# Patient Record
Sex: MALE | Race: BLACK OR AFRICAN AMERICAN | URBAN - METROPOLITAN AREA
[De-identification: names, ages, dates, MRNs, and addresses within clinical notes are randomized per-mention and may not be internally consistent; named-entity substitution may affect disease eponyms.]

---

## 2018-05-16 ENCOUNTER — HOSPITAL ENCOUNTER (INPATIENT)
Facility: CLINIC | Age: 43
LOS: 7 days | Discharge: PSYCHIATRIC OR SUBSTANCE USE FACILITY | End: 2018-05-23
Attending: EMERGENCY MEDICINE | Admitting: PSYCHIATRY & NEUROLOGY
Payer: COMMERCIAL

## 2018-05-16 DIAGNOSIS — Z91.148 NONCOMPLIANCE WITH MEDICATION REGIMEN: ICD-10-CM

## 2018-05-16 DIAGNOSIS — R45.851 SUICIDAL IDEATION: ICD-10-CM

## 2018-05-16 DIAGNOSIS — F33.2 MAJOR DEPRESSIVE DISORDER, RECURRENT, SEVERE WITHOUT PSYCHOTIC FEATURES (H): Primary | ICD-10-CM

## 2018-05-16 DIAGNOSIS — B20 HUMAN IMMUNODEFICIENCY VIRUS (HIV) DISEASE (H): ICD-10-CM

## 2018-05-16 LAB
ALBUMIN SERPL-MCNC: 3.7 G/DL (ref 3.4–5)
ALP SERPL-CCNC: 69 U/L (ref 40–150)
ALT SERPL W P-5'-P-CCNC: 23 U/L (ref 0–70)
AMPHETAMINES UR QL SCN: POSITIVE
ANION GAP SERPL CALCULATED.3IONS-SCNC: 8 MMOL/L (ref 3–14)
AST SERPL W P-5'-P-CCNC: 16 U/L (ref 0–45)
BARBITURATES UR QL: NEGATIVE
BASOPHILS # BLD AUTO: 0 10E9/L (ref 0–0.2)
BASOPHILS NFR BLD AUTO: 0.2 %
BENZODIAZ UR QL: NEGATIVE
BILIRUB SERPL-MCNC: 0.4 MG/DL (ref 0.2–1.3)
BUN SERPL-MCNC: 13 MG/DL (ref 7–30)
CALCIUM SERPL-MCNC: 8.9 MG/DL (ref 8.5–10.1)
CANNABINOIDS UR QL SCN: NEGATIVE
CHLORIDE SERPL-SCNC: 104 MMOL/L (ref 94–109)
CO2 SERPL-SCNC: 28 MMOL/L (ref 20–32)
COCAINE UR QL: NEGATIVE
CREAT SERPL-MCNC: 1.15 MG/DL (ref 0.66–1.25)
DIFFERENTIAL METHOD BLD: NORMAL
EOSINOPHIL # BLD AUTO: 0.3 10E9/L (ref 0–0.7)
EOSINOPHIL NFR BLD AUTO: 4.1 %
ERYTHROCYTE [DISTWIDTH] IN BLOOD BY AUTOMATED COUNT: 13.5 % (ref 10–15)
GFR SERPL CREATININE-BSD FRML MDRD: 70 ML/MIN/1.7M2
GLUCOSE SERPL-MCNC: 96 MG/DL (ref 70–99)
HCT VFR BLD AUTO: 45.5 % (ref 40–53)
HGB BLD-MCNC: 15.9 G/DL (ref 13.3–17.7)
IMM GRANULOCYTES # BLD: 0 10E9/L (ref 0–0.4)
IMM GRANULOCYTES NFR BLD: 0.2 %
LYMPHOCYTES # BLD AUTO: 2.9 10E9/L (ref 0.8–5.3)
LYMPHOCYTES NFR BLD AUTO: 36 %
MCH RBC QN AUTO: 31.2 PG (ref 26.5–33)
MCHC RBC AUTO-ENTMCNC: 34.9 G/DL (ref 31.5–36.5)
MCV RBC AUTO: 89 FL (ref 78–100)
MONOCYTES # BLD AUTO: 0.5 10E9/L (ref 0–1.3)
MONOCYTES NFR BLD AUTO: 5.5 %
NEUTROPHILS # BLD AUTO: 4.4 10E9/L (ref 1.6–8.3)
NEUTROPHILS NFR BLD AUTO: 54 %
NRBC # BLD AUTO: 0 10*3/UL
NRBC BLD AUTO-RTO: 0 /100
OPIATES UR QL SCN: NEGATIVE
PCP UR QL SCN: NEGATIVE
PLATELET # BLD AUTO: 227 10E9/L (ref 150–450)
POTASSIUM SERPL-SCNC: 3.4 MMOL/L (ref 3.4–5.3)
PROT SERPL-MCNC: 7.8 G/DL (ref 6.8–8.8)
RBC # BLD AUTO: 5.09 10E12/L (ref 4.4–5.9)
SODIUM SERPL-SCNC: 140 MMOL/L (ref 133–144)
TSH SERPL DL<=0.005 MIU/L-ACNC: 1.26 MU/L (ref 0.4–4)
WBC # BLD AUTO: 8.1 10E9/L (ref 4–11)

## 2018-05-16 PROCEDURE — 36415 COLL VENOUS BLD VENIPUNCTURE: CPT | Performed by: PSYCHIATRY & NEUROLOGY

## 2018-05-16 PROCEDURE — 85025 COMPLETE CBC W/AUTO DIFF WBC: CPT | Performed by: PSYCHIATRY & NEUROLOGY

## 2018-05-16 PROCEDURE — 25000132 ZZH RX MED GY IP 250 OP 250 PS 637: Performed by: PSYCHIATRY & NEUROLOGY

## 2018-05-16 PROCEDURE — 80053 COMPREHEN METABOLIC PANEL: CPT | Performed by: PSYCHIATRY & NEUROLOGY

## 2018-05-16 PROCEDURE — 90853 GROUP PSYCHOTHERAPY: CPT

## 2018-05-16 PROCEDURE — 12400006 ZZH R&B MH INTERMEDIATE

## 2018-05-16 PROCEDURE — 80307 DRUG TEST PRSMV CHEM ANLYZR: CPT | Performed by: EMERGENCY MEDICINE

## 2018-05-16 PROCEDURE — 84443 ASSAY THYROID STIM HORMONE: CPT | Performed by: PSYCHIATRY & NEUROLOGY

## 2018-05-16 PROCEDURE — 99222 1ST HOSP IP/OBS MODERATE 55: CPT | Performed by: PHYSICIAN ASSISTANT

## 2018-05-16 PROCEDURE — 99285 EMERGENCY DEPT VISIT HI MDM: CPT

## 2018-05-16 PROCEDURE — 99207 ZZC CDG-MDM COMPONENT: MEETS MODERATE - UP CODED: CPT | Performed by: PHYSICIAN ASSISTANT

## 2018-05-16 RX ORDER — QUETIAPINE FUMARATE 25 MG/1
25-50 TABLET, FILM COATED ORAL EVERY 6 HOURS PRN
Status: DISCONTINUED | OUTPATIENT
Start: 2018-05-16 | End: 2018-05-23 | Stop reason: HOSPADM

## 2018-05-16 RX ORDER — MIRTAZAPINE 15 MG/1
15 TABLET, FILM COATED ORAL AT BEDTIME
Status: DISCONTINUED | OUTPATIENT
Start: 2018-05-16 | End: 2018-05-23 | Stop reason: HOSPADM

## 2018-05-16 RX ORDER — OLANZAPINE 5 MG/1
5 TABLET, ORALLY DISINTEGRATING ORAL 2 TIMES DAILY
Status: DISCONTINUED | OUTPATIENT
Start: 2018-05-16 | End: 2018-05-21

## 2018-05-16 RX ORDER — HYDROXYZINE HYDROCHLORIDE 25 MG/1
25 TABLET, FILM COATED ORAL EVERY 4 HOURS PRN
Status: DISCONTINUED | OUTPATIENT
Start: 2018-05-16 | End: 2018-05-23 | Stop reason: HOSPADM

## 2018-05-16 RX ADMIN — OLANZAPINE 5 MG: 5 TABLET, ORALLY DISINTEGRATING ORAL at 21:55

## 2018-05-16 RX ADMIN — OLANZAPINE 5 MG: 5 TABLET, ORALLY DISINTEGRATING ORAL at 13:47

## 2018-05-16 RX ADMIN — MIRTAZAPINE 15 MG: 15 TABLET, FILM COATED ORAL at 21:55

## 2018-05-16 ASSESSMENT — ENCOUNTER SYMPTOMS
DYSPHORIC MOOD: 1
HALLUCINATIONS: 0

## 2018-05-16 NOTE — H&P
"He Admitted:     05/16/2018      IDENTIFYING DATA AND REASON FOR REFERRAL:  Sd Gee is a 43-year-old man who reports he is single but in a relationship with his partner of 4 years.  He states they are currently having a dimitris situation and their relationship.  He resides with his partner in [place].  He states he works as a  for a local hotel and has held a job for the last 2 years.  He holds a 4-year degree from [employer] in marketing and accounting and reports a couple prior psychiatric hospitalizations.  He presented to the hospital on account of suicidality and is admitted as a voluntary patient.      CHIEF COMPLAINT:  \"I am playing [place] [employer] Heal[employer] with my life right now, and I wanted to stop.\"      HISTORY OF PRESENT ILLNESS:  The patient reports that he was diagnosed HIV positive in 2005, and at that time, he thought he had been handed a death sentence.  Since then, he reports he has done very well on medication and claims his viral load is about 628.  He states he feels healthy otherwise and has been able to maintain employment and his social status.  He tells me that 4 years ago, his mom gave him an ultimatum to get help for his chemical use problems, and at that time, he checked himself into the [facility] and was there for 21 days.  He reports that it did nothing for him, because he was not willing to quit using alcohol at the time.  He states that he continues to drink an average of 3-4 glasses of wine every day, most times with dinner; white he claims that this can escalate at times.  He denies that he has ever experienced blackouts or alcohol withdrawal seizures.  He denies history of delirium tremens.  He does not endorse missing work on account of his drinking and denies any legal consequences from his alcohol use.  Per his report, about 3 months ago, he decided to try methamphetamines for the first time, and unfortunately for him, he has been using " "methamphetamines since then.  He reports that he has become a different person.  He states he gets bouts of irritability and has developed a very short fuse.  He reports a decline in his appetite for food.  States his sleep has become very poor, as he has high energy for extended periods of time.  He states that his mood has been drastically affected, as he has become more depressed.  He states that he experiences paranoia and auditory hallucinations and feels he has \"hyperacute hearing\" when he is on methamphetamines.  He states he gets very anxious and fidgety and claims he often crashes from his high when he is at work, prompting him to use caffeinated beverages to keep himself awake.  He reports that lately, he has been experiencing feelings of hopelessness on account of which he has come into the hospital on a voluntary basis to stop the roller coaster he is on.  He states he knows that he would not pursue outpatient treatment on his own; hence, his presentation to the hospital, and he expresses a willingness to complete chemical use assessment, hoping that he will get referred to residential CD treatment.  The patient endorses feelings of hopelessness, helplessness, but denies any active self-harm thoughts.  He reports anhedonia, but he denies history consistent with babs.  He does endorse anxiety spectrum symptoms but denies symptoms suggestive of obsessions, compulsions, or PTSD.  He denies other illicit drug use.  He tells me he is particularly worried about his current situation, because there is strong family history of depression, and his mother's sister reportedly completed suicide, and he feels that there may be other family members who suffer from depression, but the family does not talk about mental health issues, as he claims they are \"a [facility] family where talking about mental health is taboo.\"  The patient claims he was, at some point, on Wellbutrin but is currently not on any " medications.  He denies history consistent with ADHD, pathological gambling, or compulsive sexual behavior.      PAST PSYCHIATRIC HISTORY:  The patient reports that he currently does not have a psychiatrist or therapist but reports previous psychiatric hospitalization.      CHEMICAL USE HISTORY:  As described above.      PAST MEDICAL HISTORY:  Human immunodeficiency virus positive but asymptomatic.      PAST SURGICAL HISTORY:  None reported.      FAMILY PSYCHIATRIC HISTORY:  Depression, on both sides of the family.      SOCIAL HISTORY:  The patient reports that he is originally from [place], [place].  He finally moved to [place] when he was 12.  His father is .  He states he has 4 brothers and 6 sisters, and they are all from the same parents.  He states he is the third youngest.  He denies any history of physical, emotional or sexual abuse.  Following high school, he attended [employer].  He denies  or criminal history.      REVIEW OF SYSTEMS:  I refer the reader to the 10-point review of systems documented by Deanne Zepeda PA-C on 2018 at 10:35 a.m.      VITAL SIGNS:  Blood pressure 117/83, pulse 89, respirations 16, temperature 97.5, weight 197 pounds.      MENTAL STATUS EXAMINATION:  This is a middle-aged man who appears his stated age of 43.  He is dressed in hospital scrubs and ambulates on his own without difficulty.  He makes good eye contact and speaks softly but clearly and coherently.  His mood is described as dysphoric, and his affect is labile.  His thought process is logical and relevant.  He does not endorse the presence of auditory or visual hallucinations but admits to paranoia.  He admits to fleeting self-harm thoughts with no plans.  He does not endorse any homicidal ideations, plans or intent.  His impulse control is marginal.  His associations are tight.  His language is appropriate.  His gait and station are within normal limits.  His muscle strength is adequate.  He  displays adequate recall of recent and remote events.  His attention span and concentration are limited.  His impulse control is marginal.  The patient displays fair insight and judgment.  Risk assessment at this point is considered moderate.      DIAGNOSTIC IMPRESSION:  Christiana Gee is a 43-year-old man who is in a 4-year relationship with his partner and is currently employed, who presents to the hospital on account of worsening depression in the context of abusing methamphetamines and alcohol.  He is requesting help, because he recently was contemplating suicide.      ADMITTING DIAGNOSES:   1.  Major depressive disorder, recurrent, moderate.   2.  Alcohol use disorder.   3.  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   4.  Human immunodeficiency virus, on HAART, CD4 count of 628 as of 18.      PLAN:  The patient will be maintained on the step-down unit.  He is a voluntary patient at this time.  He will be encouraged to participate in individual milieu and group therapy.  Staff will provide a safe place for him, and he will be encouraged to ventilate his stressors.  He will be offered mirtazapine at 15 mg at bedtime along with Zyprexa 10 mg at bedtime.  Estimated length of stay 4-7 days.         JOSEPH CASTELAN MD             D: 2018   T: 2018   MT: FRANCHESCA      Name:     CHRISTIANA GEE   MRN:      [id number]        Account:      [id number]   :      1975        Admitted:     2018                   Document: [id number]

## 2018-05-16 NOTE — ED PROVIDER NOTES
History     Chief Complaint:  Suicidal     HPI   Sd Gee is a 43-year-old male with a history of HIV, depression, and alcohol abuse who presents to the emergency department for evaluation of suicidality.  The patient reports that he recently stopped taking Wellbutrin because he was feeling well.  However, now he states that he is feeling low energy, crabby, and losing interest in things that he normally would enjoy such as his job.  The patient was admitted a couple of years ago for suicidality in the past.  At that time, the patient had recently lost his mother and sister.    The patient reports that he was going to overdose on pills on Mother's Day but did not actually take any pills.  The patient reports that in the past he has used alcohol to cope with depression.  He states his last alcoholic drink was 2 weeks ago. He has no history of alcohol withdrawal.  However, he states that 2-3 days ago he smoked methamphetamine for the first time.  He remarks that this is adding to his dysphoric mood as he feels quite guilty about this.  He notes that he is also having discord in his relationship with his partner.    When asked what he would like to happen from visiting the emergency department, he states that he came in with the idea to get admitted but he states that he is not excited about it.    He reports full compliance with his antiretroviral medication.  He states his last viral load has remained very low and his most recent CD4 count was around 600.  He has no recent fevers or other focal pain or concern for infection.      Allergies:  No known drug allergies.      Medications:    Wellbutrin     Past Medical History:    Alcohol use disorder   Depression   HIV     Past Surgical History:    History reviewed. No pertinent past surgical history.     Family History:    History reviewed. No pertinent family history.     Social History:  Marital Status: Life partner   Presents to the ED alone   Tobacco Use:  "Never  Alcohol Use: Daily      Review of Systems   Psychiatric/Behavioral: Positive for dysphoric mood and suicidal ideas. Negative for hallucinations and self-injury.   All other systems reviewed and are negative.    Physical Exam   First Vitals:  BP: 128/84  Heart Rate: 96  Temp: 97.4  F (36.3  C)  Resp: 16  Height: 177.8 cm (5' 10\")  Weight: 89.4 kg (197 lb)  SpO2: 96 %    Physical Exam  General: male semi-recumbent in room BH1  HENT: mucous membranes moist  Eyes: PERRL without nystagmus  CV: extremities well perfused, regular rhythm  Resp:  normal effort  GI: abdomen soft and nontender, no guarding  MSK: no bony tenderness   Skin: appropriately warm and dry  Neuro: alert, clear speech, oriented, normal tone in extremities, ambulatory  Psych:  calm, cooperative, reports feeling suicidal but does not want to act on these thoughts, depressed mood, good eye contact, cooperative with ED staff and basic cares, no evidence of hallucinations      Emergency Department Course   Laboratory:  Drug abuse screen 77 urine: POSITIVE for amphetamines, otherwise WNL.      Emergency Department Course:  Nursing notes and vitals reviewed.    (9917) I entered the room with my scribe, obtained the history, and performed an exam of the patient as documented above.    Urine sample was obtained and sent for laboratory analysis, findings above.     (0050) I called central intake to inquire about bed placement.     The care of the patient will be signed out to Dr. Ovalle pending bed availability for inpatient psych admission.      Impression & Plan    Medical Decision Making:  Both the patient and I think he would benefit from hospitalization for more intense mental health care.  His medication compliance is likely contributing to his recurrent depression with thoughts of suicide by overdose.  He has attempted this in the past.  He is calm and cooperative and seems to sincerely desire help.  I considered, but do not suspect, medical " "precipitants or intoxication or withdrawal state.  I do not think his HIV history is directly related to this presentation.  He is currently waiting a bed on an appropriate inpatient mental health unit, and is boarding in the ED until one can be obtained.      Diagnosis:    ICD-10-CM   1. Suicidal ideation R45.851   2. Human immunodeficiency virus (HIV) disease (H) B20   3. Noncompliance with medication regimen Z91.14     Disposition:  The care of the patient will be signed out to Dr. Ovalle pending bed availability for inpatient psych admission.     [facility] EMERGENCY DEPARTMENT '      This record was created at least in part using electronic voice recognition software, so please excuse any \"typos.\"        Scribe disclosure:  I, Abdoul Escobar, am serving as a scribe on 5/16/2018 at 5:26 AM to personally document services performed by Dr. Braun based on my observations and the provider's statements to me.                 Curly Braun MD  05/16/18 0745    "

## 2018-05-16 NOTE — H&P
[facility]    History and Physical  Hospitalist       Date of Admission:  5/16/2018    Assessment & Plan   Sd Gee is a 43 year old male with history of HIV on HAART, depression, methamphetamine use who presents with suicidal ideation after stopping Wellbutrin approximately 3 months ago.    # Suicidal Ideation: Stop Wellbutrin approximately 3 months ago and has had decreased interest in his usual enjoyable activities.  -Labs pending: CBC, CMP, TSH  -Mental health plan per psychiatry    # Substance abuse, methamphetamine: Smokes meth approximately 4 times per week over the past 1 year last use yesterday.  No history of cardiac events or issues.  Family history of cardiac disease that he knows of. Denies IVDU. Pt would like to quit, he denies every using drugs before last year. He feels it is detracting from his wellbeing.  -Defer to psych   -Encouraged cessation, rehab if needed    # Hx Depression: Previously took Wellbutrin however states he stopped this approximately 3 months ago.  He would like to resume this medication or find another one that may work as he does not want to have flares of suicidal thoughts.  -Defer to psych regarding initiation of antidepressants     # HIV on HAART: Patient reports last CD4 was 628 last month.  He is known about his HIV since approximately 2005.  He is very compliant with his HIV medication.  Baseline Cr from 2015 12.3-1.29.  -Continue PTA Triumeq  -No need to check CD4 at this time    # Pain Assessment:  Current Pain Score 5/16/2018   Pain score (0-10) 0   Sd s pain level was assessed and he currently denies pain.      DVT Prophylaxis: Ambulate every shift    Code Status: Full Code    Disposition: Expected discharge in per primary service, psychiatry.      This patient was discussed with Dr. Jonny Mas of the Hospitalist Service who agrees with current plans as outlined above.    Hospital service will chart check tomorrow to evaluate results of  pending labs, not yet drawn. If no discrepancies, will likely sign off.    Deanne Zepeda PA-C  Hospitalist Physician Assistant  Pager: [phone]      Primary Care Physician   Provider Not In System    Chief Complaint   Suicidal ideation    History is obtained from the patient and electronic health record    History of Present Illness   Sd Gee is a 43 year old male with history of HIV on HAART, depression, methamphetamine use who presents to [facility] emergency department with suicidal ideation.  He reports stopping his Wellbutrin approximately 3 months ago which has led to a disinterest in his usually enjoyed activities.  He notes he was admitted a couple of years ago in 2015 for suicidal ideation after the death of his mother and sister.  He initially plan to overdose on pills on Mother's Day but did not.  Reports last alcoholic drink 1.5-2 weeks ago.  Admits to smoking meth yesterday and states he typically smokes about 4 times per week.  Urine tox screen positive for amphetamine negative for all others.  No signs of an stable.  Labs ordered but not drawn yet.  Psych was called and he was admitted to the mental health unit.    After transfer to the mental health unit, his sitter was discontinued as it did not appear there was an acute need.  He states that his last CD4 was 620 last month.  He had HIV since 2005.  He has close follow-up and is compliant with his HAART therapy.  No concerns regarding HIV.  My visit the patient denies headache, blurry vision, dizziness, lightheadedness.  No chest pain, shortness of breath, abdominal pain, changes to bowel and bladder.  He does endorse feeling tired after smoking meth.  History of cardiac problems.    Past Medical History    I have reviewed this patient's medical history and updated it with pertinent information if needed.   Past Medical History:   Diagnosis Date     Asymptomatic human immunodeficiency virus (HIV) infection status  "(H)        Past Surgical History   No prior surgeries.    Prior to Admission Medications   Prior to Admission Medications   Prescriptions Last Dose Informant Patient Reported? Taking?   BuPROPion HCl (WELLBUTRIN XL PO) 5/16/2018 at AM Self Yes Yes   Sig: Take by mouth daily   abacavir-dolutegravir-LamiVUDine (TRIUMEQ) 600- MG per tablet 5/16/2018 at AM Pharmacy No Yes   Sig: Take 1 tablet by mouth At Bedtime   Patient taking differently: Take 1 tablet by mouth every morning       Facility-Administered Medications: None     Allergies   No Known Allergies    Social History   Patient lives in apartment in [place] with his life partner.  He denies tobacco history.  He drinks wine 1-2 glasses a few days a week and reports \"getting drunk\" 1-2 times per week off of 3 glasses of wine.  Denies liquor or beer use.  Has never withdrawn from alcohol.  No seizures.  Patient admits he started smoking meth approximately 1 year ago and over the past several months he has been smoking about 4 times per week.  He denies any past IV drug use.    Family History   I have reviewed this patient's family history and updated it with pertinent information if needed.   Family History   Problem Relation Age of Onset     Aneurysm Mother      brain aneurysm causing death (2015)     Cirrhosis Father    Maternal side of family with breast cancer in multiple women.    Review of Systems   The 10 point Review of Systems is negative other than noted in the HPI.     Physical Exam   Temp: 97.5  F (36.4  C) Temp src: Oral BP: 117/83   Heart Rate: 89 Resp: 16 SpO2: 97 % O2 Device: None (Room air)    Vital Signs with Ranges  /83  Temp 97.5  F (36.4  C) (Oral)  Resp 16  Ht 1.778 m (5' 10\")  Wt 89.4 kg (197 lb)  SpO2 97%  BMI 28.27 kg/m2  Temp:  [97.4  F (36.3  C)-97.5  F (36.4  C)] 97.5  F (36.4  C)  Heart Rate:  [85-96] 89  Resp:  [16-18] 16  BP: (117-128)/(81-84) 117/83  SpO2:  [96 %-97 %] 97 %  197 lbs 0 oz    General: Awake, alert, " 42 yo male who appears stated age. Looks comfortable and very pleasant during interview.  Eyes: Conjunctiva and pupils examined and normal.  HEENT: Normocephalic, atraumatic. Extraocular movements intact. Pupils equal round and reactive to light bilaterally. Oropharynx clear without exudates, poor dentition.   Respiratory: Clear to auscultation bilaterally, no crackles or wheezing.  Cardiovascular: Regular rate and rhythm, +S1 and S2 without murmur, gallops, or rubs. No peripheral edema. Dorsalis pedis pulses palpable bilaterally.  Gastrointestinal: Soft, non-tender, non-distended. Normoactive bowel sounds x4 quadrants.  Skin: Warm, dry. No rashes, no obvious rashes or lesions on exposed skin.  Musculoskeletal: No joint swelling, erythema or tenderness. Moves all extremities equally.  Neurologic: AAO x3. Cranial nerves 2-12 grossly intact, normal strength and sensation.  Psychiatric: Appropriate mood and affect. No obvious anxiety or depression.    Data   Data reviewed today:  I personally reviewed no images or EKG's today.  No lab results found in last 7 days.    Imaging:  No results found for this or any previous visit (from the past 24 hour(s)).

## 2018-05-16 NOTE — ED PROVIDER NOTES
Patient signed out pending mental health bed placement.  Patient has a history of depression.  He has not been taking his medications.  Recently feeling more depressed, having thoughts of overdosing and presented seeking help for his depression.  He has had prior MH admits.  No recent alcohol use.  Did use meth a few days ago.  He is a voluntary admit.  Admitted without issues.      Shelia Ovalle MD  05/16/18 [id number]

## 2018-05-16 NOTE — PHARMACY-ADMISSION MEDICATION HISTORY
"Admission medication history interview status for the 5/16/2018  admission is complete. See EPIC admission navigator for prior to admission medications     Medication history source reliability: Moderate    Actions taken by pharmacist (provider contacted, etc): Called [facility] pharmacist to verify medications. The pharmacist stated that he only has Triumeq and Remeron on file. Patient stated that Remeron has been discontinued. He also stated that he takes Wellbutrin and he only goes to one pharmacy which is [facility]. The patient will contact his partner \"Mark\" to check the bottle at home for the Wellbutrin dose.     Additional medication history information not noted on PTA med list :None    Medication reconciliation/reorder completed by provider prior to medication history? No    Time spent in this activity: 45 mins    Prior to Admission medications    Medication Sig Last Dose Taking? Auth Provider   abacavir-dolutegravir-LamiVUDine (TRIUMEQ) 600- MG per tablet Take 1 tablet by mouth At Bedtime  Patient taking differently: Take 1 tablet by mouth every morning  5/16/2018 at AM Yes Adriel Richter MD   BuPROPion HCl (WELLBUTRIN XL PO) Take by mouth daily 5/16/2018 at AM Yes Unknown, Entered By History       "

## 2018-05-16 NOTE — IP AVS SNAPSHOT
MRN:[id number]                      After Visit Summary   5/16/2018    Sd Gee    MRN: [id number]           Thank you!     Thank you for choosing [facility] for your care. Our goal is always to provide you with excellent care.        Patient Information     Date Of Birth          1975        Designated Caregiver       Most Recent Value    Caregiver    Will someone help with your care after discharge? no      About your hospital stay     You were admitted on:  May 16, 2018 You last received care in the:  [facility]    You were discharged on:  May 23, 2018       Who to Call     For medical emergencies, please call 911.  For non-urgent questions about your medical care, please call your primary care provider or clinic, None          Attending Provider     Provider Specialty    Curly Braun MD Emergency Medicine    Vasquez, Shelia SAENZ MD Emergency Medicine    Adriel Vale MD Psychiatry    [facility], Rebecca Rogers MD Psychiatry       Primary Care Provider Fax #    Provider Not In System [phone]      Further instructions from your care team       Behavioral Discharge Planning and Instructions    Summary:  Admitted for suicidal ideation.     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     Major Treatments, Procedures and Findings:  Psychiatric assessment. Medication adjustment.     Symptoms to Report: Feeling more agitated, Increased confusion, Losing more sleep, Mood getting worse or Thoughts of suicide    Lifestyle Adjustment:  Follow all treatment recommendations. Develop and follow safety plans.  Abstain from the use of all mood-altering substances, including alcohol, as usage may increase symptoms of depression. Maintain sobriety by attending  daily AA or NA meetings and obtaining a sponsor.     Psychiatry Follow-up:     You have an appointment for medication management with Dr. Alex Cifuentes at [facility] in [place] on Tuesday, June 19, 2018 at 2:00 pm. This appointment will be at their temporary location in [facility].  Please arrive 15-20 minutes early to complete paperwork. Please bring your insurance card and medication list with you to this appointment. You can discuss a referral for therapy at this appointment.     [facility] (Temporary Address)  [address] [place] [address]  [phone] / [phone] fax    You are discharging to [facility] for chemical dependency treatment.     [facility]  [address] [place] [address]  [phone] / [phone] / [phone] fax    Resources:   Crisis Intervention: 677.318.4012 or 007-226-6597 (TTY: 300.938.7103).  Call anytime for help.  National [place] on Mental Illness (www.[place].org): 183.673.6916 or 290-071-9498.  Alcoholics Anonymous (www.alcoholics-anonymous.org): Check your phone book for your local chapter.  National Suicide Prevention Line (www.mentalhealthmn.org): 358-241-XIWX (1630)  COPE (Crisis Services for Adults) in [facility]: 597.944.9071 (Available 24/7)    General Medication Instructions:   See your medication sheet(s) for instructions.   Take all medicines as directed.  Make no changes unless your doctor suggests them.   Go to all your doctor visits.  Be sure to have all your required lab tests. This way, your medicines can be refilled on time.  Do not use any drugs not prescribed by your doctor.  Avoid alcohol.      Pending Results     No orders found from 5/14/2018 to 5/17/2018.            Admission Information     Date & Time Provider Department Dept. Phone    5/16/2018 Rebecca Russo MD [facility] [place] [facility] [phone]      Your Vitals Were     Blood  "Pressure Pulse Temperature Respirations Height Weight    142/86 102 98.2  F (36.8  C) (Oral) 17 1.778 m (5' 10\") 86.8 kg (191 lb 6.4 oz)    Pulse Oximetry BMI (Body Mass Index)                97% 27.46 kg/m2          [employer] Information     [employer] lets you send messages to your doctor, view your test results, renew your prescriptions, schedule appointments and more. To sign up, go to [email or web address] . Click on \"Log in\" on the left side of the screen, which will take you to the Welcome page. Then click on \"Sign up Now\" on the right side of the page.     You will be asked to enter the access code listed below, as well as some personal information. Please follow the directions to create your username and password.     Your access code is: [email or web address]  Expires: 2018  3:15 PM     Your access code will  in 90 days. If you need help or a new code, please call your [facility] clinic or [phone].        Care EveryWhere ID     This is your Care EveryWhere ID. This could be used by other organizations to access your [facility] medical records  [id number]        Equal Access to Services     ALL CALLAHAN AH: Gino Ervin, wadavonteda lusteven, qaybta kaalmada adeegyada, sourav mann. So [facility] [phone].    ATENCIÓN: Si habla español, tiene a pickard disposición servicios gratuitos de asistencia lingüística. Llame al [phone].    We comply with applicable federal civil rights laws and [place] laws. We do not discriminate on the basis of race, color, national origin, age, disability, sex, sexual orientation, or gender identity.               Review of your medicines      START taking        Dose / Directions    mirtazapine 15 MG tablet   Commonly known as:  REMERON   Used for:  Major depressive disorder, recurrent, severe without psychotic features (H)        Dose:  15 mg   Take 1 tablet (15 mg) by mouth At Bedtime   Quantity:  30 tablet   Refills:  0       "   CONTINUE these medicines which have NOT CHANGED        Dose / Directions    abacavir-dolutegravir-LamiVUDine 600- MG per tablet   Commonly known as:  TRIUMEQ   Indication:  HIV Disease   Used for:  Human immunodeficiency virus (HIV) disease (H)        Dose:  1 tablet   Start taking on:  5/24/2018   Take 1 tablet by mouth every morning   Quantity:  30 tablet   Refills:  0         STOP taking     WELLBUTRIN XL PO                Where to get your medicines      These medications were sent to [facility] [place], [place] - [address]  [address] [place] [address]     Phone:  [phone]     abacavir-dolutegravir-LamiVUDine 600- MG per tablet    mirtazapine 15 MG tablet                Protect others around you: Learn how to safely use, store and throw away your medicines at www.disposemymeds.org.        ANTIBIOTIC INSTRUCTION     You've Been Prescribed an Antibiotic - Now What?  Your healthcare team thinks that you or your loved one might have an infection. Some infections can be treated with antibiotics, which are powerful, life-saving drugs. Like all medications, antibiotics have side effects and should only be used when necessary. There are some important things you should know about your antibiotic treatment.      Your healthcare team may run tests before you start taking an antibiotic.    Your team may take samples (e.g., from your blood, urine or other areas) to run tests to look for bacteria. These test can be important to determine if you need an antibiotic at all and, if you do, which antibiotic will work best.      Within a few days, your healthcare team might change or even stop your antibiotic.    Your team may start you on an antibiotic while they are working to find out what is making you sick.    Your team might change your antibiotic because test results show that a different antibiotic would be better to treat your infection.    In some cases, once your team  has more information, they learn that you do not need an antibiotic at all. They may find out that you don't have an infection, or that the antibiotic you're taking won't work against your infection. For example, an infection caused by a virus can't be treated with antibiotics. Staying on an antibiotic when you don't need it is more likely to be harmful than helpful.      You may experience side effects from your antibiotic.    Like all medications, antibiotics have side effects. Some of these can be serious.    Let you healthcare team know if you have any known allergies when you are admitted to the hospital.    One significant side effect of nearly all antibiotics is the risk of severe and sometimes deadly diarrhea caused by Clostridium difficile (C. Difficile). This occurs when a person takes antibiotics because some good germs are destroyed. Antibiotic use allows C. diificile to take over, putting patients at high risk for this serious infection.    As a patient or caregiver, it is important to understand your or your loved one's antibiotic treatment. It is especially important for caregivers to speak up when patients can't speak for themselves. Here are some important questions to ask your healthcare team.    What infection is this antibiotic treating and how do you know I have that infection?    What side effects might occur from this antibiotic?    How long will I need to take this antibiotic?    Is it safe to take this antibiotic with other medications or supplements (e.g., vitamins) that I am taking?     Are there any special directions I need to know about taking this antibiotic? For example, should I take it with food?    How will I be monitored to know whether my infection is responding to the antibiotic?    What tests may help to make sure the right antibiotic is prescribed for me?      Information provided by:  www.cdc.gov/getsmart  U.S. Department of Health and Human Services  Centers for disease  Control and Prevention  National Center for Emerging and Zoonotic Infectious Diseases  Division of Healthcare Quality Promotion             Medication List: This is a list of all your medications and when to take them. Check marks below indicate your daily home schedule. Keep this list as a reference.      Medications           Morning Afternoon Evening Bedtime As Needed    abacavir-dolutegravir-LamiVUDine 600- MG per tablet   Commonly known as:  TRIUMEQ   Take 1 tablet by mouth every morning   Start taking on:  5/24/2018   Last time this was given:  1 tablet on 5/23/2018  8:59 AM                                mirtazapine 15 MG tablet   Commonly known as:  REMERON   Take 1 tablet (15 mg) by mouth At Bedtime   Last time this was given:  15 mg on 5/22/2018  9:20 PM                                          More Information        Depression: Tips to Help Yourself    As your healthcare providers help treat your depression, you can also help yourself. Keep in mind that your illness affects you emotionally, physically, mentally, and socially. So full recovery will take time. Take care of your body and your soul, and be patient with yourself as you get better.  Self-care    Educate yourself. Read about treatment and medicine options. If you have the energy, attend local conferences or support groups. Keep a list of useful websites and helpful books and use them as needed. This illness is not your fault. Don t blame yourself for your depression.    Manage early symptoms. If you notice symptoms returning, experience triggers, or identify other factors that may lead to a depressive episode, get help as soon as possible. Ask trusted friends and family to monitor your behavior and let you know if they see anything of concern.    Work with your provider. Find a provider you can trust. Communicate honestly with that person and share information on your treatment for depression and your reaction to medicines.    Be prepared  for a crisis. Know what to do if you experience a crisis. Keep the phone number of a crisis hotline and know the location of your community's urgent care centers and the closest emergency department.    Hold off on big decisions. Depression can cloud your judgment. So wait until you feel better before making major life decisions, such as changing jobs, moving, or getting  or .    Be patient. Recovering from depression is a process. Don t be discouraged if it takes some time to feel better.    Keep it simple. Depression saps your energy and concentration. So you won t be able to do all the things you used to do. Set small goals and do what you can.    Be with others. Don t isolate yourself--you ll only feel worse. Try to be with other people. And take part in fun activities when you can. Go to a movie, ballgame, Jew service, or social event. Talk openly with people you can trust. And accept help when it s offered.  Take care of your body  People with depression often lose the desire to take care of themselves. That only makes their problems worse. During treatment and afterward, make a point to:    Exercise. It s a great way to take care of your body. And studies have shown that exercise helps fight depression.    Avoid drugs and alcohol. These may ease the pain in the short term. But they ll only make your problems worse in the long run.    Get relief from stress. Ask your healthcare provider for relaxation exercises and techniques to help relieve stress.    Eat right. A balanced and healthy diet helps keep your body healthy.  Date Last Reviewed: 1/1/2017 2000-2017 [employer]. [address] [place] [address], [place] [address]. All rights reserved. This information is not intended as a substitute for professional medical care. Always follow your healthcare professional's instructions.                Recovering from Addiction    Recovery means making a new life for yourself. This  includes finding new interests. It involves building new relationships. It means taking better care of yourself. These will all help you replace substance use with a new and healthier life. They will also help you avoid the things that could make you want to use again.  Make lifestyle changes  A big part of recovery is changing habits. These are habits that may have led to your substance abuse. It s also a time for personal growth. Below are some changes you may want to make.    Find new activities and goals. You may want to try new hobbies and interests. Or, you may want to join an activity group to meet new people.    Build relationships. You may choose to spend more time with loved ones you lost touch with while you were using. You may also want to make new friends. And there may be some friends or family members you will not want to see because they are still using.    Exercise and eat well. Get some physical activity on most days. This can help you feel better. Eat healthy meals with lots of fruits, vegetables, and whole grains. This can also help your well-being. A nutritionist or fitness expert can help you.    Maintain ties with medical and addiction professionals. While you may not need intense professional support, it is important to have reliable safety nets so you can access professional assistance when needed.    Relax and get enough sleep. Good sleep can help you feel better. So can less stress. Ask your counselor about relaxation exercises. These may include meditation. Also ask about stress management classes.  Date Last Reviewed: 2/1/2017 2000-2017 [employer]. [address] [place] [address], [place] [address]. All rights reserved. This information is not intended as a substitute for professional medical care. Always follow your healthcare professional's instructions.                Understanding the Disease of Addiction  What is addiction?  Addiction is a long-lasting (chronic) disease of  the brain. It affects how your brain learns and works.Your genes and your environment can affect your risk for addiction. A family history of addiction also raises your risk. But anyone can have an addiction.Unfortunately, many people falsely think that addiction is a moral weakness. They think that people addicted to drugs or alcohol are just behaving badly or making poor choices.  How does addiction affect my brain?  Whether you start using drugs or alcohol is your choice. But once your brain is exposed to the addictive substance, your brain begins to change. This is especially true if you are more at risk for addiction. These brain changes overpower your self-control. This happens because the substance overexcites the brain s reward center. The substance mimics the brain's own natural feel-good chemicals. The brain is rewired into believing that the substance is a good thing and that you need it to survive. This rewiring is very strong. Over time, you no longer find pleasure in other things you once enjoyed. The addiction is more powerful.  If you keep using the substance, your brain makes less of its own feel-good chemicals. You then must keep using drugs or alcohol to try to make up for the low levels of the brain chemicals. Over time your brain needs more and more of the drug or alcohol to achieve this. You need the drug. You no longer think about the physical, emotional, and social harm it causes.  Can you become addicted to things other than drugs or alcohol?  Addiction can happen in response to other pleasurable things that stimulate the brain s reward center. These things include eating, having sex, gambling, using tobacco, and using the internet.  Can you get control over a brain disease?  The only way to get over an addiction is to stop using the substance. Not using it lets your brain recover and go back to its normal functioning. You can relearn how to find pleasure in other things again. But your  brain will always be at risk for addiction. Addiction is very powerful. So you usually will need medical help and social support for long-term success.  Addiction is a chronic condition. It s common for people who are recovering from addiction to start using the substance again (called a relapse). This doesn t mean that treatment doesn t work. Just like other chronic health conditions, addiction requires ongoing treatment that changes as the person s needs change.    Date Last Reviewed: 5/1/2017 2000-2017 [employer]. [address] [place] [address]. All rights reserved. This information is not intended as a substitute for professional medical care. Always follow your healthcare professional's instructions.

## 2018-05-16 NOTE — ED NOTES
Bed: [facility]  Expected date:   Expected time:   Means of arrival:   Comments:  Triage- suicidal

## 2018-05-16 NOTE — PROGRESS NOTES
05/16/18 1121   Patient Belongings   Did you bring any home meds/supplements to the hospital?  Yes   Disposition of meds  Other (see comment)   Patient Belongings other (see comments)   Disposition of Belongings Other (see comment)   Belongings Search Yes   Clothing Search Yes   Second Staff Abdoul     Smart Phone  Hoodie w/Strings  Jeans x 7  Flannel Pajamas x 2  Sandals  Lysol Spray  Phone Case  Coffee Mug  Sunglasses w/ Pouch x 5  Baseball Cap  Electric Shaver w/  Alarm Clock  Razor  Razor head container w/Head x1  Nail Clipper  Foam Dice  Mesh Netting Bag  Deoderant x 3  Conditioner  Contact Lens Cleanser  Contact Lens Container x3  Night Shade  Toothpaste  Axe Body Wash x 2  Black pouch  Calendar/Planner  Condom  [facility] Certification of Vital Records  Loose Change  Belt  Tank Top x 3  Underwear x13  Misc. Papers  Pens x2  Purple T-Shirt  Shoes w/Laces x 3  Pair of Socks x 11  [place]  Wallet  [place] ID  Black Duffel Bags x 2  T-Shirt x 12  Shorts w/Laces x2   w/Adapter     Security Envelope #[id number]:  SS Card  [employer] Gift Card  Visa #[id number]    Security Envelope #[id number]:  [employer] 9 Tabs    Admission:  I am responsible for any personal items that are not sent to the safe or pharmacy. [facility] is not responsible for loss, theft or damage of any property in my possession.        Patient Signature: ___________________________________________      Date/Time:__________________________     Staff Signature: __________________________________      Date/Time:__________________________     [facility] Staff person, if patient is unable/unwilling to sign:      __________________________________________________________      Date/Time: __________________________        Discharge:  [facility] has returned all of my personal belongings:     Patient Signature: ________________________________________     Date/Time: ____________________________________     Staff Signature:  ______________________________________     Date/Time:_____________________________________

## 2018-05-16 NOTE — PLAN OF CARE
Problem: Patient Care Overview  Goal: Discharge Needs Assessment  Patient attended Process Group today and participated appropriately. Patient demonstrated good insight into the topic of discussion.  Patient was able to verbalize that he is committed to completing a treatment program for his own health and wellness. He stated that in the past he has always gone to treatment to please his family. He reflected on the fact that quite a few of his friends have overdosed in the past year and he does not want to end up like them.

## 2018-05-16 NOTE — IP AVS SNAPSHOT
[place] [address] [place] [place] [address]    Phone:  [phone]                                       After Visit Summary   5/16/2018    Sd Gee    MRN: [id number]           After Visit Summary Signature Page     I have received my discharge instructions, and my questions have been answered. I have discussed any challenges I see with this plan with the nurse or doctor.    ..........................................................................................................................................  Patient/Patient Representative Signature      ..........................................................................................................................................  Patient Representative Print Name and Relationship to Patient    ..................................................               ................................................  Date                                            Time    ..........................................................................................................................................  Reviewed by Signature/Title    ...................................................              ..............................................  Date                                                            Time

## 2018-05-16 NOTE — ED NOTES
Pt is not at high risk for suicide and does not need 1:1 sitter or continuous observation at this time.

## 2018-05-16 NOTE — ED NOTES
I have performed an in person assessment of the patient. Based on this assessment the patient no longer requires a one on one attendant at this point in time.    Curly Braun MD  5:50 AM  May 16, 2018           Curly Braun MD  05/16/18 0540

## 2018-05-16 NOTE — PROGRESS NOTES
Patient admitted from ED.  Patient denies suicidal ideation.  Patient was contemplating overdosing on meds on mothers day as he was grieving his mothers death. She  2.5 years ago.  He stated he quit his meds about 3 months ago. Currently he is using meth daily for the past 2 months and drinks at least one glass of wine daily.  He was diagnosed with HIV in  and denies any health problems relating to HIV at this time. Lives with his partner and has a steady job.  Welcome packet reviewed with patient. Information reviewed includes getting emergency help, preventing infections, understanding your care, using medication safely, reducing falls, preventing pressure ulcers, smoking cessation, powerful choices and Patients Bill of Rights. Pt. given tour of the unit and instruction on use of facility including emergency call light. Program schedule reviewed with patient. Questions regarding the unit addressed. Pt. Search completed and belongings inventoried.   Nursing assessment complete including patient and medication profiles. Risk assessments completed addressing suicide,fall,skin,nutrition and safety issues. Care plan initiated. Assessments reviewed with physician and admit orders received.

## 2018-05-17 PROCEDURE — 25000132 ZZH RX MED GY IP 250 OP 250 PS 637: Performed by: PSYCHIATRY & NEUROLOGY

## 2018-05-17 PROCEDURE — 90853 GROUP PSYCHOTHERAPY: CPT

## 2018-05-17 PROCEDURE — 12400006 ZZH R&B MH INTERMEDIATE

## 2018-05-17 RX ADMIN — MIRTAZAPINE 15 MG: 15 TABLET, FILM COATED ORAL at 21:44

## 2018-05-17 RX ADMIN — ABACAVIR SULFATE, DOLUTEGRAVIR SODIUM, LAMIVUDINE 1 TABLET: 600; 50; 300 TABLET, FILM COATED ORAL at 09:29

## 2018-05-17 RX ADMIN — OLANZAPINE 5 MG: 5 TABLET, ORALLY DISINTEGRATING ORAL at 21:44

## 2018-05-17 RX ADMIN — OLANZAPINE 5 MG: 5 TABLET, ORALLY DISINTEGRATING ORAL at 09:29

## 2018-05-17 NOTE — PROGRESS NOTES
Hospitalist progress note:    Charge check for Mr. Gee regarding labs that were drawn yesterday.  CMP, CBC, TSH all within normal limits.  No further medical concerns at this time.  Please continue to manage per psychiatry and continue home medications.      Hospitalist service will sign off.  Please reconsult if medical issues arise and warranted.    Deanne Zepeda PA-C  Hospitalist Physician Assistant  Pager: [phone]

## 2018-05-17 NOTE — PROGRESS NOTES
[facility] Psychiatric Progress Note      Interval History:   Pt seen, team meeting held with , nursing staff, OT, and PA's to review diagnosis and treatment plan. Patient reports he is doing better today. He says he does not recall most of our discussion from yesterday. He says he feels much less paranoid and was able to sleep overnight. He says he feels his body is aching. He says he thought he would wake up in the morning and his symptoms resolved but has not a case.  He states he still very willing to pursue residential CD treatment.  He denies current self-harm thoughts or plans.  Staff report that he is insightful and cooperative with recommended treatment.     Review of systems:   The Review of Systems is negative other than noted in the HPI     Medications:       abacavir-dolutegravir-LamiVUDine  1 tablet Oral QAM     mirtazapine  15 mg Oral At Bedtime     OLANZapine zydis  5 mg Oral BID     hydrOXYzine, QUEtiapine    Mental Status Examination:     Appearance:  awake, drowsy, adequately groomed and casually dressed  Eye Contact:  better  Speech:  clear, coherent  Psychomotor Behavior:  no evidence of tardive dyskinesia, dystonia, or tics and fidgeting  Mood:  better   Affect:  Mood congruent with restricted range  Thought Process:  logical, linear and goal oriented no loose associations  Thought Content:  no evidence of suicidal ideation or homicidal ideation.  Lingering paranoia but denies the presence of auditory or visual hallucinations   Oriented to:  time, person, and place  Attention Span and Concentration:  limited  Recent and Remote Memory:  limited  Fund of Knowledge: appropriate  Muscle Strength and Tone: normal  Gait and Station: Normal  Insight:  fair  Judgment:  limited          Labs/Vitals:     Recent Results (from the past 24 hour(s))   CBC with platelets differential    Collection Time: 05/16/18 11:48 AM   Result Value Ref Range    WBC 8.1 4.0 - 11.0 10e9/L     RBC Count 5.09 4.4 - 5.9 10e12/L    Hemoglobin 15.9 13.3 - 17.7 g/dL    Hematocrit 45.5 40.0 - 53.0 %    MCV 89 78 - 100 fl    MCH 31.2 26.5 - 33.0 pg    MCHC 34.9 31.5 - 36.5 g/dL    RDW 13.5 10.0 - 15.0 %    Platelet Count 227 150 - 450 10e9/L    Diff Method Automated Method     % Neutrophils 54.0 %    % Lymphocytes 36.0 %    % Monocytes 5.5 %    % Eosinophils 4.1 %    % Basophils 0.2 %    % Immature Granulocytes 0.2 %    Nucleated RBCs 0 0 /100    Absolute Neutrophil 4.4 1.6 - 8.3 10e9/L    Absolute Lymphocytes 2.9 0.8 - 5.3 10e9/L    Absolute Monocytes 0.5 0.0 - 1.3 10e9/L    Absolute Eosinophils 0.3 0.0 - 0.7 10e9/L    Absolute Basophils 0.0 0.0 - 0.2 10e9/L    Abs Immature Granulocytes 0.0 0 - 0.4 10e9/L    Absolute Nucleated RBC 0.0    Comprehensive metabolic panel    Collection Time: 05/16/18 11:48 AM   Result Value Ref Range    Sodium 140 133 - 144 mmol/L    Potassium 3.4 3.4 - 5.3 mmol/L    Chloride 104 94 - 109 mmol/L    Carbon Dioxide 28 20 - 32 mmol/L    Anion Gap 8 3 - 14 mmol/L    Glucose 96 70 - 99 mg/dL    Urea Nitrogen 13 7 - 30 mg/dL    Creatinine 1.15 0.66 - 1.25 mg/dL    GFR Estimate 70 >60 mL/min/1.7m2    GFR Estimate If Black 84 >60 mL/min/1.7m2    Calcium 8.9 8.5 - 10.1 mg/dL    Bilirubin Total 0.4 0.2 - 1.3 mg/dL    Albumin 3.7 3.4 - 5.0 g/dL    Protein Total 7.8 6.8 - 8.8 g/dL    Alkaline Phosphatase 69 40 - 150 U/L    ALT 23 0 - 70 U/L    AST 16 0 - 45 U/L   TSH with free T4 reflex and/or T3 as indicated    Collection Time: 05/16/18 11:48 AM   Result Value Ref Range    TSH 1.26 0.40 - 4.00 mU/L     B/P: 110/76, T: 97.7, P: Data Unavailable, R: 14    Impression:   Sd Gee is a 43-year-old man who is in a 4-year relationship with his partner and is currently employed, who presents to the hospital on account of worsening depression in the context of abusing methamphetamines and alcohol.  He is requesting help, because he recently was contemplating suicide.       DIagnoses:     1.   Major depressive disorder, recurrent, moderate.   2.  Alcohol use disorder.   3.  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   4.  Human immunodeficiency virus, on HAART, CD4 count of 628 as of 4/20/18.          Plan:   1. Written information given on medications. Side effects, risks, benefits reviewed.  2.  Reduce Zyprexa to 5 mg p.o. q.h.s.  3.  Awaiting CD assessment.  4.  Continue hospitalization.      Attestation:  Patient has been seen and evaluated by Rebecca hays MD    PATIENT ID  Name: Sd Gee  MRN:[id number]  YOB: 1975

## 2018-05-17 NOTE — PLAN OF CARE
Problem: Depressive Symptoms  Goal: Depressive Symptoms  Signs and symptoms of listed problems will be absent or manageable.   Outcome: No Change  Pt reports feeling sad, but denies suicidal ideation. Cooperative and gracious of staff. Med compliant. Isolative most of shift. Encourage participation in groups.

## 2018-05-17 NOTE — PLAN OF CARE
Problem: Patient Care Overview  Goal: Team Discussion  Team Plan:   Outcome: No Change  BEHAVIORAL TEAM DISCUSSION    Participants: Dr. Russo, , Nursing staff and PA's  Progress: No Change  Continued Stay Criteria/Rationale: Until stabilized with aftercare in place  Medical/Physical: Reduce Zyprexa to 5mg p.o. qhs  Precautions:   Behavioral Orders   Procedures     Code 1 - Restrict to Unit     Routine Programming     As clinically indicated     Status 15     Every 15 minutes.     Plan: Patient was given medication information and gave verbal consent. Plan of care was discussed with patient and team. Reduce Zyprexa to 5mg p.o. qhs. Continue hospitalization while awaiting CD assessment and placement.  Rationale for change in precautions or plan: To further stabilize the Pt.

## 2018-05-17 NOTE — PROGRESS NOTES
SPIRITUAL HEALTH SERVICES Progress Note  [facility]    Initial visit per pt request. Pt reports feeling better and declined offer of a SH visit. SH offered future availability per pt request.                                                                                                                                           Jaspal Clifton M.Div., [facility]  Staff   Pager [phone]

## 2018-05-17 NOTE — PLAN OF CARE
"Problem: Depressive Symptoms  Goal: Depressive Symptoms  Signs and symptoms of listed problems will be absent or manageable.   Pleasant and cooperative. Pt presents with a flat and sad affect but brightens upon approach. Pt reports improvements in mood compared to yesterday and stated that he is feeling more hopeful today.  Pt spoke with staff about going to treatment and stated that he is \"keeping an open mind.\" Pt attended groups and participated with insight. Denies SI.       "

## 2018-05-18 PROCEDURE — H0001 ALCOHOL AND/OR DRUG ASSESS: HCPCS

## 2018-05-18 PROCEDURE — 12400006 ZZH R&B MH INTERMEDIATE

## 2018-05-18 PROCEDURE — 25000132 ZZH RX MED GY IP 250 OP 250 PS 637: Performed by: PSYCHIATRY & NEUROLOGY

## 2018-05-18 RX ADMIN — OLANZAPINE 5 MG: 5 TABLET, ORALLY DISINTEGRATING ORAL at 09:04

## 2018-05-18 RX ADMIN — MIRTAZAPINE 15 MG: 15 TABLET, FILM COATED ORAL at 21:29

## 2018-05-18 RX ADMIN — ABACAVIR SULFATE, DOLUTEGRAVIR SODIUM, LAMIVUDINE 1 TABLET: 600; 50; 300 TABLET, FILM COATED ORAL at 09:04

## 2018-05-18 RX ADMIN — OLANZAPINE 5 MG: 5 TABLET, ORALLY DISINTEGRATING ORAL at 21:29

## 2018-05-18 NOTE — PLAN OF CARE
Problem: Depressive Symptoms  Goal: Depressive Symptoms  Signs and symptoms of listed problems will be absent or manageable.   Outcome: No Change  Pt was calm and cooperative, pleasant with staff and peers. Mostly withdrawn and isolative to room but brightens on approach. CD consult was completed this afternoon and pt was agreeable to recommendation for residential treatment. Referred to [facility] and DEREK was signed for the facility. Brightened following visit from .

## 2018-05-18 NOTE — PROGRESS NOTES
[facility] Psychiatric Progress Note      Interval History:   Pt seen, team meeting held with , nursing staff, OT, and PA's to review diagnosis and treatment plan.  Patient reports he is doing better.  He reports much less paranoia.  He completed the chemical use assessment and the [facility] advised that he is likely to get into a program sooner than later. He says he is ready to start the process and feels he has come in at a good time. He reports he has been having back pain but feels he can get by without medications. He reports much less paranoia. Denies suicidal or homicidal ideation. Tolerating medications well without significant side effects       Review of systems:   The Review of Systems is negative other than noted in the HPI     Medications:       abacavir-dolutegravir-LamiVUDine  1 tablet Oral QAM     mirtazapine  15 mg Oral At Bedtime     OLANZapine zydis  5 mg Oral BID     hydrOXYzine, QUEtiapine    Mental Status Examination:     Appearance:  awake, drowsy, adequately groomed and casually dressed  Eye Contact:  better  Speech:  clear, coherent  Psychomotor Behavior:  no evidence of tardive dyskinesia, dystonia, or tics and fidgeting  Mood:  better   Affect:  Mood congruent with restricted range  Thought Process:  logical, linear and goal oriented no loose associations  Thought Content:  no evidence of suicidal ideation or homicidal ideation.  Lingering paranoia albeit better but denies the presence of auditory or visual hallucinations   Oriented to:  time, person, and place  Attention Span and Concentration:  limited  Recent and Remote Memory:  limited  Fund of Knowledge: appropriate  Muscle Strength and Tone: normal  Gait and Station: Normal  Insight:  fair  Judgment:  limited          Labs/Vitals:     No results found for this or any previous visit (from the past 24 hour(s)).  B/P: 110/76, T: 97.7, P: Data Unavailable, R: 14    Impression:   Sd Gee is a 43-year-old  man who is in a 4-year relationship with his partner and is currently employed, who presents to the hospital on account of worsening depression in the context of abusing methamphetamines and alcohol.  He is requesting help, because he recently was contemplating suicide.       DIagnoses:     1.  Major depressive disorder, recurrent, moderate.   2.  Alcohol use disorder.   3.  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   4.  Human immunodeficiency virus, on HAART, CD4 count of 628 as of 4/20/18.          Plan:   1. Written information given on medications. Side effects, risks, benefits reviewed.  2.  Continue hospitalization.      Attestation:  Patient has been seen and evaluated by Rebecca hays MD    PATIENT ID  Name: Sd Gee  MRN:[id number]  YOB: 1975

## 2018-05-18 NOTE — CONSULTS
The writer met with the patient introduced herself and her role. The writer completed an assessment with the patient. Recommendation is for residential treatment. The patient agreed with that recommendation. The patient signed a DEREK for [facility]. The writer provided the patient with her business card. The writer will finish up the assessment and send the referral information to [place].

## 2018-05-18 NOTE — PROGRESS NOTES
This [facility] patient, or other Residential/Lodging CD Treatment patient is a categorical Vulnerable Adult according to [place] Statute 626.5572 subdivision 21.    Susceptibility to abuse by others     1.  Have you ever been emotionally abused by anyone?          No    2.  Have you ever been bullied, or physically assaulted by anyone?        No    3.  Have you ever been sexually taken advantage of or sexually assaulted?        No    4.  Have you ever been financially taken advantage of?        No    5.  Have you ever hurt yourself intentionally such as burns or cuts?       No    Risk of abusing other vulnerable adults     1.  Have you ever bullied, berated or emotionally degraded someone else?       No    2.  Have you ever financially taken advantage of someone else?       No    3.  Have you ever sexually exploited or assaulted another person?       No    4.  Have you ever gotten into fights, verbal arguments or physically assaulted someone?          No    Based on the above information:    This [facility] patient, or other Residential/Lodging CD Treatment patient is a categorical Vulnerable Adult according to [facility] Statue 626.5572 subdivision 21.

## 2018-05-18 NOTE — PROGRESS NOTES
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 reading your mind or could hear your thoughts or that you could actually read someone's mind or hear what another person was thinking?  No    22. Have you ever believed that someone or some force outside of yourself was putting thoughts into your mind or made you act in a way that was not your usual self?  Have you ever thought you were possessed?  No    23. Have you ever believed you were being sent special messages through the TV, radio, newspaper or internet?  No    24. Have you ever heard things other people couldn't hear, such as voices or other noises?  Yes, explain: while under the influence of meth.    25. Have you ever had visions when you were awake?  Or have you ever seen things other people couldn't see?  Yes, explain: only under the influence of meth.    26. Have you ever had to lie to people important to you about how much you schroeder?  No    27. Have you ever felt the need to bet more and more money?  No    28. Have you ever attempted treatment for a gambling problem?  No    29. Highest grade of school completed:  College graduate    30. Do you have any difficulties with reading, writing or calculating?  No    31. Have you ever been diagnosed with a learning disability, such as ADHD or dyslexia?  No    32. What is your  preferred learning style?  by reading, by hands-on practice and by watching someone else demonstrate    33. Do you have any problems with memory impairment or problem solving?  No    34. Do you have any problems with headaches or dizziness?  No    35. Have you ever been in the ?  No    36. Have you been diagnosed with traumatic brain injury or Alzheimer s?  No    37. Have you ever hit your head or been hit on the head?  No    38. Have you ever had medical treatment for an injury to your head?  No    39. Have you had any significant illness that affected your brain (brain tumor, meningitis, West Nile Virus, stroke, seizure, heart attack, near drowning or near suffocation)?  No    40. Have you ever been diagnosed with Fetal Alcohol Effects or Fetal Alcohol Syndrome?  No    41. What are your some of your personal strengths?  per pt: willing to listen, good listener.    Dimension III Ratings Summary   Emotional/Behavioral/Cognitive - The placing authority must use the criteria in Dimension III to determine a client s emotional, behavioral, and cognitive conditions and complications.   RISK DESCRIPTIONS - Severity ratin Client has difficulty with impulse control and lacks coping skills. Client has thoughts of suicide or harm to others without means; however, the thoughts may interfere with participation in some treatment activities. Client has difficulty functioning in significant life areas. Client has moderate symptoms of emotional, behavioral, or cognitive problems. Client is able to participate in most treatment activities.    REASONS SEVERITY WAS ASSIGNED - What current issues might with thinking, feelings or behavior pose barriers to participation in a treatment program? What coping skills or other assets does the person have to offset those issues? Are these problems that can be initially accommodated by a treatment provider? If not, what specialized skills or attributes must a provider have?    Pt has  mental health diagnosis. Pt reported he was not taking his medication prior to admission on a consistent basis due to actively using substances. Pt reported past therapy but denied current. Pt reported past abuse but denied current. Pt denied current SI, intent, plans, or means at the time of the assessment. Pt was admitted to [facility] unit for stabilization and due to suicidal thoughts. Pt reported past suicide attempts. Pt would appear to benefit from establishing on-going MH services and learning MH positive coping skills.       DIMENSION IV - Readiness to Change     1. What is your motivation for participating in this evaluation today?    Per pt: I guess the experience of withdrawal of coming down off meth never experienced in my life, never want to  that drug again let alone see it.     2. What problematic behaviors have you engaged in when using alcohol or other drugs that could be hazardous to you or others (i.e. driving a car/motorcycle/boat, operating machinery, unsafe sex, IV drug use, sharing needles, etc.)  (DSM-5: Sx #8)    Per pt: that and more, do not remember a lot of things, going to store, being with someone, watching someone get high with me and feeling they may OD due to them using needles, big part of not conducive to being clean and sober.     3. If applicable, when did you first think you had a problem with your alcohol or other drug use?    Per pt:Alcohol long time ago. Probably meth two weeks ago. It got to the point where the quantity increased a lot, needed that more than anything, if did not have it fidgety and agitated, and nothing else mattered then getting that, the more meth I did the better off I would be and actually more damage to self than anything.     4. Who in your life has shared concerns with you about your use of alcohol or other drugs?    Per pt: family friends, as of recently, shocker to most friends or family, alcohol we know he drinks but the meth thing that was a  shocker. Never had alcohol under control this stuff here, there is no appease in it. It wants you and body and keeps going. Scary part of going into psychotic stage of mind.    5. Are there any changes you have made or plan to make regarding how you had been using alcohol or other drugs?    Yes, explain: per pt: really scared the living crap out of me, cringing, do not know what to do without it when using and it became scary. Know now that no addiction is better than the other. No better smoking it or shooting it. Different feel and different place.     Dimension IV Ratings Summary   Readiness for Change - The placing authority must use the criteria in Dimension IV to determine a client s readiness for change.   RISK DESCRIPTIONS - Severity ratin Client displays verbal compliance, but lacks consistent behaviors; has low motivation for change; and is passively involved in treatment.    REASONS SEVERITY WAS ASSIGNED - (What information did the person provide that supports your assessment of his or her readiness to change? How aware is the person of problems caused by continued use? How willing is she or he to make changes? What does the person feel would be helpful? What has the person been able to do without help?)      Pt reported he never wants to experience what he has experienced with meth withdrawal as motivation for assessment. Pt reported he knew alcohol use was a problem a long time ago for him and for meth was about two weeks ago. Pt reported his family//friends have always expressed concern about his alcohol use but most were surprised about his meth use. Pt reported he has quit meth use as it scared him and open to treatment again. Pt appears to be in the contemplative stage of change evidenced by his verbal report and past behaviors regarding continued sobriety.        DIMENSION V - Relapse, Continued Use and Continued Problem Potential     1. If you have had previous periods of sobriety, when was  your longest period of sobriety and what were you doing at that time that was supporting your sobriety?  (DSM-5: Sx #2)    Per pt: Had three years without alcohol. What helped abstain during that time, inpatient treatment, [facility] house, forced to get a sponsor and do different things in recovery community, had mind very much stable, if do not have step down process, I think I can take down the world and do not need the process and it goes to zero. Engaged in community of recovery, allowed it to come to me instead of me trying to do for it.     2. Within the past 30 days, on a scale from 0-10 (0 = having no cravings at all and 10 = having very strong cravings to use alcohol or other drugs) what number would you assign to your cravings? (DSM-5: Sx #4)     Per pt:   4/10 alcohol  10/10 meth    3. Can you identify any specific reasons or specific triggers that contribute to you being more likely to consume alcohol or other drugs? (DSM-5: Sx #4)    Yes, explain:per pt: body needing it, being around familiar lifestyle of people who continue to do drugs and drink, not being involved with community group, sponsor, or Scientologist group, not wanting treatment, not wanting to complete treatment since I feel better after a certain time. Me trying to be controlling of situation instead of me trying to let them help me through situation.    4. Have you been treated for alcohol/other substance use disorder? (DSM-5: Sx #2)  Yes  4B. Number of times(lifetime) (over what period) 10.   4C. Number of times completed treatment (lifetime) 9.   4D. During the past three years have you participated in outpatient and/or residential?  Yes  4E. When and where? Three of them, last one [place] [facility] [place], [facility] before [facility]  4F. What was helpful? What was not? Per pt: helpful-different, me being openly casarez man, thinking best fit for me, great teachings there for me that was not for me, know who I am, went  through that phase, better at [facility], what do you do, help yourself, not easy had work and homework, they really did help, Bob, they were trying to figure there way through not as much structure. [facility] gave me the best fit, did not like the most, they demanded to get recovery instead of appease me. That would stood out as most helpful and I knew it was bad, reached out director there, asked me about drinking again and told her about meth. Once this is over we will see how this goes. Maybe try that route, seemingly had me on a good stage for a while.    5. Support group participation: Have you/do you attend 12-step or other support group meetings? How recently? What was your experience? Are you willing to restart? If the person has not participated, is he or she willing?  (DSM-5: Sx #2)    Per pt: NA, AA lot of times, celebrate recovery stuff, and then get to see a lot of people that are struggling like you but they fight for it and still clean and sober, [facility]. Two months ago last meeting. Past sponsorship-same timeframe.     6. Do you drink alcohol or use other drugs in larger amounts than intended or over a longer period of time than was intended?  (DSM-5: Sx #1)    Yes, explain: increased to daily use and more amounts of meth, and drinks large amount of alcohol.    7. Do you spend a great deal of time engaged in activities necessary to obtain alcohol or other drugs, a great deal of time using alcohol or other drugs, or a great deal of time recovering from alcohol or other drug use?  (DSM-5: Sx #3)    Yes, explain: daily use of large amounts.    Dimension V Ratings Summary   Relapse/Continued Use/Continued problem potential - The placing authority must use the criteria in Dimension V to determine a client s relapse, continued use, and continued problem potential.   RISK DESCRIPTIONS - Severity ratin No awareness of the negative impact of mental health problems or substance abuse. No coping  skills to arrest mental health or addiction illnesses, or prevent relapse.    REASONS SEVERITY WAS ASSIGNED - (What information did the person provide that indicates his or her understanding of relapse issues? What about the person s experience indicates how prone he or she is to relapse? What coping skills does the person have that decrease relapse potential?)      Pt reported 3 years of longest period of sobriety from alcohol and how he abstained was treatment, sober housing, and participated in sober community activities and sponsorship. Pt reported he just started using meth in 03/2018 which lead to daily use until admission to hospital  Pt reported what lead back to use and triggers as body needing it to not go through withdrawal, being in environment where people are using that he is familiar with, not being a part of sober community, and feeling he has it under control and does not need support anymore. Pt reported ten treatments in his lifetime in which he completed 9 of them. Pt reported past sober support group meeting attendance and sponsorship. Pt would appear to benefit from gaining sober living skills again and relapse skills, education about cross addiction.        DIMENSION VI - Recovery Environment     1. Are you employed or attending school?    Per pt: not currently working or attending school. Right now options weighing more to go [facility] tx.     2. If working or a student, are you able to function appropriately in that setting?     No    3. Has your job and/or school work been negatively impacted by your use of alcohol of other drugs?  (DSM-5: Sx #5 & Sx #7)    Yes    4. How would you describe your current finances?  Just making it     5. Are you having financial problems, such as money being tight, living paycheck to paycheck, having unpaid or late bills, having significant debt, a history of bankruptcy, or IRS problems?    No-would spend all his money and drain bank account from using.      6.  Describe a typical day; evening for you. Work, school, social, leisure activities, volunteer, exercise, spiritual practices or other daily tasks.    NA    7. Have you reduced or discontinued recreational activities, hobbies or other leisure activities as a result of your use of alcohol or other drugs?  (DSM-5: Sx #7)    Yes, explain: stopped going to [employer] orthodox and meetings.    8. Who do you live with?      Friends moving around.     9. Are there any people in the home who have current substance abuse issues or have mental health issues?     NA    10. Tell me about your living environment/neighborhood? Ask enough follow up questions to determine safety, criminal activity, availability of alcohol and drugs, supportive or antagonistic to the person making changes.      NA    11. Are you concerned for your safety or anyone else's safety in the home? No    12. Do you have plans to move somewhere else or change your living environment in any manner?    NA    13. Do you have children who live with you?     No    14. Do you have children who do not live with you?     No    15. Do you have any history of being involved with Child Protection Services?  No     16. Are you currently in a significant relationship?     Yes, been with partner for 4 years, dimitris relationship.    17. How do you identify your sexual orientation?    Pineda    18. The patient reported: being single, in a serious relationship.    19. Does your significant other have a history of substance abuse or have current substance abuse issues?    NA    20. How important is substance use to your social connections? Do many of your family or friends use?     When actively using with people.     21. Who in your life would you consider to be your primary support network at this time?    Per clt: my sponsor, ex-sponsor, wants what is best for me, around here sober community, hoping that I am able to get back in treatment and get back in right track, siblings. Want to see  best for me.     22. Have any of your relationships (S.O., family members, friends, employers, teachers, etc.) been negatively impacted by your use of alcohol or other drugs?  (DSM-5: Sx #6)    Yes, explain: with sponsor and family and friends.     23. Do you currently participate in community tatiana activities, such as attending Yarsanism, temple, Gnosticism or Samaritan services?  Yes, explain: did at one point but past six much weaning away from it, Mu-ism, still active in mom's Yarsanism even though she is not with us anymore but my addiction picked up and so started to wean away from that.     24. Criminal justice history: Gather current/recent history and any significant history related to substance use--Arrests? Convictions? Circumstances? Alcohol or drug involvement? Sentences? Still on probation or parole? Expectations of the court? Current court order?  (DSM-5: Sx #8)    None    25. Are you or have you ever been a registered sex offender?  No    26. Do you have a child protection worker,  or ?  No    27. Are you currently on any type of commitment?  No, I'm not on any type of commitment at this time.    28. Do you have a valid 's license?  Yes    29. What obstacles exist to participating in treatment? (Time off work, childcare, funding, transportation, pending nursing home time, living situation)     None    Dimension VI Ratings Summary   Recovery environment - The placing authority must use the criteria in Dimension VI to determine a client s recovery environment.   RISK DESCRIPTIONS - Severity ratin Client has (A) Chronically antagonistic significant other, living environment, family, peer group or long-term criminal justice involvement that is harmful to recovery or treatment progress, or (B) Client has an actively antagonistic significant other, family, work, or living environment with immediate threat to the client s safety and well-being.    REASONS SEVERITY WAS ASSIGNED - (What  support does the person have for making changes? What structure/stability does the person have in his or her daily life that will increase the likelihood that changes can be sustained? What problems exist in the person s environment that will jeopardize getting/staying clean and sober?)     (A) Pt reported he is not currently attending school or employed. Pt reported he was living with friends up until admission. Pt reported his ex-sponsor and siblings are supportive of him. Pt denied being in a romantic relationship currently or having children. Pt reported his use negatively impacted his finances, and relationships and occupation. Pt denied past or current legal issues. PT appears to lack a living environment conducive to recovery. Pt appears to lack stable housing and appears to lack sober network.         Mental Health Status   Physical Appearance/Attire: Appears stated age and Neat   Hygiene: well groomed   Eye Contact: at examiner   Speech Rate:  regular   Speech Volume: regular   Speech Quality: fluid   Cognitive/Perceptual:  reality based   Cognition: memory intact    Judgment: able to concentrate   Insight: able to concentrate   Orientation:  time, place, person and situation   Thought:   concrete   Hallucinations:  none   General Behavioral Tone: cooperative   Psychomotor Activity: no problem noted   Gait:  no problem   Mood: appropriate   Affect: congruence/appropriate   Counselor Notes: NA     Patient Choices/Exceptions     Would you like services specific to language, age, gender, culture, Islam preference, race, ethnicity, sexual orientation or disability?  Yes - [facility]    What particular treatment choices and options would you like to have? [facility]    Do you have a preference for a particular treatment program?  [facility]    Patient is willing to follow treatment recommendations.  Yes    DSM-5 Criteria for Substance Use Disorder   Criteria for Diagnosis  Instructions: Determine whether  the client currently meets the criteria for Substance Use Disorder using the diagnostic criteria in the DSM-5 pp.[id number]. Current means during the most recent 12 months outside a facility that controls access to substances.    A problematic pattern of alcohol/drug use leading to clinically significant impairment or distress, as manifested by at least two of the following, occurring within a 12-month period:    1. Alcohol/drug is often taken in larger amounts or over a longer period than was intended.  2. There is a persistent desire or unsuccessful efforts to cut down or control alcohol/drug use  3. A great deal of time is spent in activities necessary to obtain alcohol/drug, use alcohol/drug, or recover from its effects.  4. Craving, or a strong desire or urge to use alcohol/drug  5. Recurrent alcohol/drug use resulting in a failure to fulfill major role obligations at work, school or home.  6. Continued alcohol use despite having persistent or recurrent social or interpersonal problems caused or exacerbated by the effects of alcohol/drug.  7. Important social, occupational, or recreational activities are given up or reduced because of alcohol/drug use.  8. Recurrent alcohol/drug use in situations in which it is physically hazardous.  9. Alcohol/drug use is continued despite knowledge of having a persistent or recurrent physical or psychological problem that is likely to have been caused or exacerbated by alcohol.  10. Tolerance, as defined by either of the following: A need for markedly increased amounts of alcohol/drug to achieve intoxication or desired effect. and A markedly diminished effect with continued use of the same amount of alcohol/drug.  11. Withdrawal, as manifested by either of the following: The characteristic withdrawal syndrome for alcohol/drug (refer to Criteria A and B of the criteria set for alcohol/drug withdrawal).          Specify if: In early remission:  After full criteria for alcohol/drug  use disorder were previously met, none of the criteria for alcohol/drug use disorder have been met for at least 3 months but for less than 12 months (with the exception that Criterion A4,  Craving or a strong desire or urge to use alcohol/drug  may be met).     In sustained remission:   After full criteria for alcohol use disorder were previously met, non of the criteria for alcohol/drug use disorder have been met at any time during a period of 12 months or longer (with the exception that Criterion A4,  Craving or strong desire or urge to use alcohol/drug  may be met).   Specify if:   This additional specifier is used if the individual is in an environment where access to alcohol is restricted.    Mild: Presence of 2-3 symptoms  Moderate: Presence of 4-5 symptoms  Severe: Presence of 6 or more symptoms    DSM-5 Substance Use Disorder Diagnosis     Alcohol Use Disorder Severe - 303.90 (F10.20)  Amphetamine Use Disorder Severe - 304.40 (F15.20)    Collateral Contact Summary     Number of contacts made:  1    Contact with referring person:  Yes, explain: EMR    If court related records were reviewed, summarize here:  No    Information from collateral contacts supported/largely agreed with information from the client and associated risk ratings.    Collateral Contact      Name: Relationship: Phone number: Releases:   [facility] (EM[facility]) Medical Records NA NA     Patient medical record chart was reviewed for collateral purposes of this assessment. See throughout above assessment collateral obtained from EMR.       Collateral Contact      Name: Relationship: Phone number: Releases:   NA NA SUNDAR KWOK      Recommendations     1. Continue to follow all recommendations during admission and at discharge of medical providers from hospitalization.  2. Abstain from using all non-prescribed mood altering chemicals and substances. Take all medication as prescribed and directed by medical providers.   3.  Attend a residential/ treatment program such as [facility].       Level of Care   I.) Intoxication and Withdrawal: 0   II.) Biomedical:  0   III.) Emotional and Behavioral:  2   IV.) Readiness to Change:  2   V.) Relapse Potential: 4   VI.) Recovery Environmental: 4     Initial Problem List     The patient has unstable housing  The patient is currently living in an unhealthy and/or using environment  The patient lacks relapse prevention skills  The patient has poor coping skills  The patient has poor refusal skills   The patient lacks a sober peer support network  The patient has dual issues of MI and CD  The patient lacks the ability to effectively manage his/her mental health issues  The patient has a significant history of trauma and/or abuse issues

## 2018-05-18 NOTE — PLAN OF CARE
Problem: General Plan of Care (Inpatient Behavioral)  Goal: Individualization/Patient Specific Goal (IP Behavioral)  The patient and/or their representative will achieve their patient-specific goals related to the plan of care.    The patient-specific goals include:  1. Patient will be compliant with medication  2.Patient will be free of suicidal ideation  3.Patient be willing to address chemical use.   Outcome: No Change  Pt calm and cooperative. Mostly isolative to his room. Med compliant, ate dinner. During 1:1 pt stated he is undecided if he would like to go to CD treatment or not. Pt states it would be a waste of time if he is not fully committed, and he cannot make that commitment at this time. Denies suicidal ideation.

## 2018-05-19 PROCEDURE — 12400006 ZZH R&B MH INTERMEDIATE

## 2018-05-19 PROCEDURE — 25000132 ZZH RX MED GY IP 250 OP 250 PS 637: Performed by: PSYCHIATRY & NEUROLOGY

## 2018-05-19 RX ADMIN — OLANZAPINE 5 MG: 5 TABLET, ORALLY DISINTEGRATING ORAL at 08:29

## 2018-05-19 RX ADMIN — OLANZAPINE 5 MG: 5 TABLET, ORALLY DISINTEGRATING ORAL at 21:18

## 2018-05-19 RX ADMIN — ABACAVIR SULFATE, DOLUTEGRAVIR SODIUM, LAMIVUDINE 1 TABLET: 600; 50; 300 TABLET, FILM COATED ORAL at 08:29

## 2018-05-19 RX ADMIN — MIRTAZAPINE 15 MG: 15 TABLET, FILM COATED ORAL at 21:18

## 2018-05-19 NOTE — PLAN OF CARE
Problem: Depressive Symptoms  Goal: Depressive Symptoms  Signs and symptoms of listed problems will be absent or manageable.   Pt has been mainly isolative to his room and presents as withdrawn and sad. He does brighten upon approach and is friendly. Pt was in the lounge for about an hour, but did not interact much with peers.He is looking forward to treatment, and hopeful that it will help him. Pt did not have much to say besides that.

## 2018-05-19 NOTE — PLAN OF CARE
Problem: Depressive Symptoms  Goal: Depressive Symptoms  Signs and symptoms of listed problems will be absent or manageable.   Outcome: No Change  Pt spent the early part of the shift in his room bed resting, then spent time in the lounge watching tv and socializing with peers. He appeared bright in affect and calm in mood. He is awaiting treatment.

## 2018-05-20 PROCEDURE — 25000132 ZZH RX MED GY IP 250 OP 250 PS 637: Performed by: PSYCHIATRY & NEUROLOGY

## 2018-05-20 PROCEDURE — 12400006 ZZH R&B MH INTERMEDIATE

## 2018-05-20 RX ADMIN — ABACAVIR SULFATE, DOLUTEGRAVIR SODIUM, LAMIVUDINE 1 TABLET: 600; 50; 300 TABLET, FILM COATED ORAL at 08:49

## 2018-05-20 RX ADMIN — OLANZAPINE 5 MG: 5 TABLET, ORALLY DISINTEGRATING ORAL at 08:49

## 2018-05-20 RX ADMIN — MIRTAZAPINE 15 MG: 15 TABLET, FILM COATED ORAL at 21:19

## 2018-05-20 RX ADMIN — OLANZAPINE 5 MG: 5 TABLET, ORALLY DISINTEGRATING ORAL at 21:19

## 2018-05-20 ASSESSMENT — ACTIVITIES OF DAILY LIVING (ADL)
HYGIENE/GROOMING: INDEPENDENT
ORAL_HYGIENE: INDEPENDENT
DRESS: SCRUBS (BEHAVIORAL HEALTH);INDEPENDENT

## 2018-05-20 NOTE — PLAN OF CARE
Problem: Depressive Symptoms  Goal: Depressive Symptoms  Signs and symptoms of listed problems will be absent or manageable.   Outcome: Improving  Pt presented with a full-range affect. Pleasant/cooperative. Calm in mood. Spent time in the lounge observing peers play a board game, and read a book in his room. Awaiting placement for treatment. Reported that he was notified that [facility] has beds available, and they are just waiting for the Rule 25 assessment to be faxed over. Hoping to discharge early this week.

## 2018-05-21 PROCEDURE — 25000132 ZZH RX MED GY IP 250 OP 250 PS 637: Performed by: PSYCHIATRY & NEUROLOGY

## 2018-05-21 PROCEDURE — 90853 GROUP PSYCHOTHERAPY: CPT

## 2018-05-21 PROCEDURE — 90791 PSYCH DIAGNOSTIC EVALUATION: CPT

## 2018-05-21 PROCEDURE — 12400006 ZZH R&B MH INTERMEDIATE

## 2018-05-21 RX ADMIN — OLANZAPINE 5 MG: 5 TABLET, ORALLY DISINTEGRATING ORAL at 08:35

## 2018-05-21 RX ADMIN — ABACAVIR SULFATE, DOLUTEGRAVIR SODIUM, LAMIVUDINE 1 TABLET: 600; 50; 300 TABLET, FILM COATED ORAL at 08:35

## 2018-05-21 RX ADMIN — MIRTAZAPINE 15 MG: 15 TABLET, FILM COATED ORAL at 21:09

## 2018-05-21 ASSESSMENT — ACTIVITIES OF DAILY LIVING (ADL)
ORAL_HYGIENE: INDEPENDENT
GROOMING: INDEPENDENT
LAUNDRY: WITH SUPERVISION
DRESS: INDEPENDENT

## 2018-05-21 NOTE — CONSULTS
"Consult Date:  05/21/2018      CHEMICAL DEPENDENCY ASSESSMENT DICTATION     EVALUATION COUNSELOR:  MARIS Farris LADC    DATE OF EVALUATION:  05/18/2018   PATIENT'S ADDRESS:  [address] [place] [address], [address], [place] [address].   TELEPHONE:  [phone].   STATISTICS:  YOB: 1975.  Age:  43.  Gender:  Male.  Marital Status:  Single.   INSURANCE:  [employer].   REFERRAL SOURCE:  Hospital.      REASON FOR EVALUATION:  Per EMR ED admission note on 05/16/2018:  \"CHIEF COMPLAINT:  Suicidal .   HPI   Sd Gee is a 43-year-old male with a history of HIV, depression, and alcohol abuse who presents to the emergency department for evaluation of suicidality.  The patient reports that he recently stopped taking Wellbutrin because he was feeling well.  However, now he states that he is feeling low energy, crabby, and losing interest in things that he normally would enjoy such as his job.  The patient was admitted a couple of years ago for suicidality in the past.  At that time, the patient had recently lost his mother and sister.      The patient reports that he was going to overdose on pills on Mother's Day but did not actually take any pills.  The patient reports that in the past he has used alcohol to cope with depression.  He states his last alcoholic drink was 2 weeks ago. He has no history of alcohol withdrawal.  However, he states that 2-3 days ago he smoked methamphetamine for the first time.  He remarks that this is adding to his dysphoric mood as he feels quite guilty about this.  He notes that he is also having discord in his relationship with his partner.      When asked what he would like to happen from visiting the emergency department, he states that he came in with the idea to get admitted but he states that he is not excited about it.      He reports full compliance with his antiretroviral medication.  He states his last viral load has remained very low and his most " "recent CD4 count was around 600.  He has no recent fevers or other focal pain or concern for infection\".     HEALTH HISTORY AND MEDICATIONS:  Per EMR ED admission on 05/16/2018.      ALLERGIES:  NO KNOWN DRUG ALLERGIES.      MEDICATIONS:  Wellbutrin, Triumeq.       PAST MEDICAL HISTORY:  Alcohol use disorder, depression, HIV.      MEDICATIONS DURING ADMISSION:  Triumeq, Atarax, Remeron, Zyprexa and Seroquel.      HISTORY OF PREVIOUS TREATMENT AND COUNSELING:  Client reported 10 treatments in his lifetime, which he completed 9 of them.  Client reported past counseling, but denied current.      HISTORY OF ALCOHOL AND DRUG USE:  Alcohol:  Age of first use 15.  Age 43 within the last 12 months drank wine every day and would have anywhere from 4-6 beers per day.  Date of last use around 05/02/2018, history of it.  None in the last year for marijuana.  Methamphetamine use:  Age of first use 43.  Age 43 within the last 12 months, 03/2018.  At first it was just 2 times per week, never injected, went up to 4 every day and every day the quantity size increased, date of last use 05/15/2018.      SUMMARY OF CHEMICAL DEPENDENCY SYMPTOMS ACKNOWLEDGED BY THE PATIENT:  The patient is meeting 11 DSM criteria for alcohol use disorder, severe and amphetamine use disorder, severe.      SUMMARY OF COLLATERAL DATA:  [facility] electronic medical record, EMR, patient medical record chart was reviewed for collateral purposes of this assessment.  See CDS assessment, collateral obtained from EMR.      MENTAL STATUS ASSESSMENT:  Physical appearance and attire:  Appears stated age and neat.  Hygiene well groomed.  Eye contact at examiner.  Speech regular.  Speech volume regular.  Speech quality fluid.  Cognitive perceptual reality based.  Cognition:  Memory intact.  Judgment, able to concentrate.  Insight, able to concentrate.  Orientation time, place, person and situation.  Thought concrete.  Hallucinations:  None.  General behavioral tone:  " Cooperative.  Psychomotor activity:  No problem noted.  Gait:  No problem.  Mood is appropriate.  Affect congruent and appropriate.      VULNERABLE ADULT ASSESSMENT:  This [facility] patient or other residential [facility] treatment patient is a categorical vulnerable adult according [place] statutes.      DSM IMPRESSION/DIAGNOSES:  F10.20, F15.20.      [facility] PLACEMENT CRITERIA:   DIMENSION 1:  Intoxication/Withdrawal:  Risk rating 0.  The patient admitted to [facility] due to suicidality.  The patient reported last use date of alcohol was around 05/02/2018 and for meth as 05/15/2018.  The patient denied past admission in detox.  The patient reported withdrawal symptoms.      DIMENSION 2:  Biomedical Conditions and Complications:  Risk rating 0.  The patient has medical condition.  The patient reported he was not taking his medications consistently prior to admission as he was using.  The patient reported he is able to get the services he needs.  The patient reported past hospitalizations related to use.      DIMENSION 3:  Emotional/Behavioral/Cognitive:  Risk rating 2.  The patient has mental health diagnosis.  The patient reported he was not taking his medication prior to admission on a consistent basis due to actively using substances.  The patient reported past therapy but denied current.  The patient reported past abuse but denied current.  The patient denied current SI, intent, plans or means at the time of assessment.  Patient was admitted to [facility] Mental Health Unit for stabilization and due to suicidal thoughts.  The patient reported past suicide attempts.  The patient would appear to benefit from establishing ongoing mental health services and learning mental health positive coping skills.      DIMENSION 4:  Readiness to Change:  Risk rating 2.  The patient reported he never wants to experience what he has experienced with meth withdrawal, has motivation for  assessment.  The patient reported he knew alcohol use was a problem a long time ago for him and for meth was about 2 weeks ago.  The patient reported family/friends have expressed concern about his alcohol use but most were surprised about his meth use.  The patient reported he has quit meth use as it scared him and open to treatment again.  The patient appears to be in the contemplative stage of change as evidenced by his verbal report and past behaviors regarding continued sobriety.      DIMENSION 5:  Relapse/Continued Use/Continued Problem Potential:  Risk rating 4.  The patient reported 3 years of longest period of sobriety from alcohol and how he abstained with treatment, sober housing, and participated in sober community activities and sponsorship.  The patient reported he just started using meth in 03/2018 which led to daily use until admission to hospital.  The patient reported what led back to use and triggers as body needing it, he did not go through withdrawal, being in environment where people are using that he is familiar with, not being a part of the sober community, and feeling he has it under control and does not need support any more.  Patient reported 10 treatments in his lifetime, which he completed 9 of them.  The patient reported past, sober support group meeting, attendance, and sponsorship.  Patient would appear to benefit from getting sober living skills again and relapse skills, education about cross addiction.      DIMENSION 6:  Recovery Environment:  Risk rating 4A.  The patient reported he is not currently attending school or employed.  The patient reported he was living with friends up until admission.  The patient reported his ex-sponsor and siblings are supportive of him.  The patient denied being in a romantic relationship currently or having children.  The patient reported his use negatively impacted his finances and relationships and occupations.  The patient denied past or current  legal issues.  The patient appears to lack a living environment conducive to recovery.  The patient appears to lack stable housing and appears to lack sober support network.        RECOMMENDATIONS.  The patient is recommended to:   1.  Continue to follow all recommendations during admission and at discharge of medical providers from hospitalization.   2.  Abstain from using all non-prescribed mood-altering chemicals and substances.  Take all medication as prescribed and directed by medical providers.   3.  Attend a residential/inpatient treatment program such as [facility].      INITIAL PROBLEM LIST:  The patient has unstable housing.  The patient is currently living in an unhealthy and using environment.  The patient lacks relapse prevention skills.  The patient has poor coping skills.  The patient has poor refusal skills.  The patient lacks a sober peer support network.  The patient has dual issues of MI and CD.  The patient lacks ability to effectively manage his mental health issues.  The patient has significant history of trauma and abuse issues.      RATIONALE:  Client reported his use has negatively impacted multiple areas of his life and he currently meets criteria for substance use disorder occurring within a 12-month period.  The patient appears to lack a living environment conducive to recovery, appears to lack stable housing and appears to lack a sober support network.  The patient would appear to benefit from gaining sober living skills again and relapse skills, education about cross addiction, and he would appear to benefit from establishing ongoing mental health services and learning mental health positive coping skills.         MARIS ULRICH, [facility]             D: 2018   T: 2018   MT: BYRON      Name:     CHRISTIANA LLANES   MRN:      [id number]        Account:       [id number]   :      1975           Consult Date:  2018      Document: [id number]

## 2018-05-21 NOTE — PLAN OF CARE
"Problem: Depressive Symptoms  Goal: Depressive Symptoms  Signs and symptoms of listed problems will be absent or manageable.   Outcome: Improving  Patient presents with full-range affect, calm mood. Pleasant, cooperative, bright. Spent all of shift in lounge watching TV and socializing with peers. Wanted to take time today to \"enjoy the people here\" and get to know them. Trying to have a positive outlook on his situation. Feels he's on a good path. Looking forward to leaving sometime this week when a bed opens up. Attended wrap-up group and participated appropriately. Med-compliant.      "

## 2018-05-21 NOTE — PLAN OF CARE
Problem: General Plan of Care (Inpatient Behavioral)  Goal: Discharge Planning  Attended process group. Discussed going off antidepressants on his own, drinking increasing amounts of alcohol, getting more depressed, admitted he needed help and agreeing to go to residential treatment.

## 2018-05-21 NOTE — H&P
"              Social History    Reason for Admission:  Patient is 43 year old male admitted to [facility]'s Inpatient Mental Health Unit on 2018.  Patient stated that several months ago he stopped taking his antidepressant medication because he felt he had \"rejuvinated\" himself.  He started using methamphetamines several months ago and started hearing voices and hallucinating.  This and the other symptoms of meth use scared him and he came to the hospital.      Previous Mental & Chemical Dependency History:  Patient's first mental health hospitalization was in  with a suicide attempt after he found out he is HIV positive.  [place] [facility]  He stated he had 3 more hospitalizations at [facility].  He had been seeing Naheed Sparks for medication management at [facility] but he hasn't seen her for a while.  Patient stated his drugs of choice have been alcohol and marijuana until the recent methamphetamine use.  Patient has had chemical dependency treatment at [facility] and [facility] in the past.  Patient does not schroeder, smoke cigarettes and drinks 3- 20 oz bottles of pop a day.      Social History:  Patient was born in [place], [place] because his mother was traveling at the time when she had him.  He and his family lived in [place].  Both patient's parents are .  His father  in  at the age of 74 from cirrhosis of the liver. His mother  a little over a year ago from a brain aneurysm.  Patient had a maternal aunt who committed suicide and a paternal uncle who was chemically dependent.  Patient has 7 brothers and 3 sisters and many nieces and nephews.  Patient has a male partner for a little under 4 years.  They have two dogs.  His partner has been sober from alcohol for 9 years.       Education and Work History:  Patient graduated from high school and attended the [employer] and has degrees in " Marketing and Accounting.  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  Patient's partner works for [employer] as an .      Living Situation:  Patient and his partner live in [place].      Legal Issues:  None.    Significant Life Events: None.     Adventism:  Religion.  A member of [employer] in [place].       History:  None.    Discharge Considerations:  Patient's support system includes his family and partner.  Patient has had a chemical dependency assessment and is agreeing to inpatient treatment at [facility] in [place].  Social Service will remain available to assist with discharge needs.

## 2018-05-21 NOTE — PLAN OF CARE
Problem: Depressive Symptoms  Goal: Depressive Symptoms  Signs and symptoms of listed problems will be absent or manageable.   Outcome: Improving  Pt presents with a full range affect and a calm mood. Pt attended groups and was bright and social on the unit socializing with staff and pts. Pt states that he feels positive about going to [facility] for CD treatment, but embarrassed that he used. Pt feels supported by his partner and looks forward to seeing his cats when at [facility]. Pt felt that some of his meds were making him sleepy and the  Changed his meds.

## 2018-05-21 NOTE — PROGRESS NOTES
[facility] Psychiatric Progress Note      Interval History:   Pt seen, team meeting held with , nursing staff, OT, and PA's to review diagnosis and treatment plan.  Patient reports he is doing much better today although he continues to experience excessive sedation in the mornings.  It was advised that this most likely was a function of Zyprexa Zydis that he had been prescribed and this was discontinued as he reports absence of auditory or visual hallucinations.  He denies experiencing further paranoia.  He underwent chemical use assessment and residential CD treatment was recommended.  It was recommended that he attend a residential/inpatient treatment program such as [facility].  Patient reports that he is a little down on himself for getting himself dependent on methamphetamines.  He states the drug took him to an erotic place in his mind that he thought to justify his actions.  He states coming down from the drug however has been very painful and miserable.  Staff report he has been very compliant and has been attending all groups.  He reports concern about being able to get his medication refilled for the 30 day supply required for treatment.  He was advised that this will be facilitated today.     Review of systems:   The Review of Systems is negative other than noted in the HPI     Medications:       abacavir-dolutegravir-LamiVUDine  1 tablet Oral QAM     mirtazapine  15 mg Oral At Bedtime     hydrOXYzine, QUEtiapine    Mental Status Examination:     Appearance:  awake, drowsy, adequately groomed and casually dressed  Eye Contact:  better  Speech:  clear, coherent  Psychomotor Behavior:  no evidence of tardive dyskinesia, dystonia, or tics and fidgeting  Mood:  better   Affect:  Mood congruent with restricted range  Thought Process:  logical, linear and goal oriented no loose associations  Thought Content:  no evidence of suicidal ideation or homicidal ideation.   Lingering paranoia albeit better but denies the presence of auditory or visual hallucinations   Oriented to:  time, person, and place  Attention Span and Concentration:  limited  Recent and Remote Memory:  limited  Fund of Knowledge: appropriate  Muscle Strength and Tone: normal  Gait and Station: Normal  Insight:  fair  Judgment:  limited          Labs/Vitals:     No results found for this or any previous visit (from the past 24 hour(s)).  B/P: 110/76, T: 97.7, P: Data Unavailable, R: 14    Impression:   Sd Gee is a 43-year-old man who is in a 4-year relationship with his partner and is currently employed, who presents to the hospital on account of worsening depression in the context of abusing methamphetamines and alcohol.  He is requesting help, because he recently was contemplating suicide.       DIagnoses:     1.  Major depressive disorder, recurrent, moderate.   2.  Alcohol use disorder.   3.  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   4.  Human immunodeficiency virus, on HAART, CD4 count of 628 as of 4/20/18.          Plan:   1. Written information given on medications. Side effects, risks, benefits reviewed.  2.  Discontinue Zyprexa.  Order discharge meds and readiness for transfer to  treatment.  3.  Continue hospitalization.        Attestation:  Patient has been seen and evaluated by Rebecca hays MD    PATIENT ID  Name: Sd Gee  MRN:[id number]  YOB: 1975

## 2018-05-21 NOTE — PLAN OF CARE
Problem: Patient Care Overview  Goal: Team Discussion  Team Plan:   Outcome: No Change  BEHAVIORAL TEAM DISCUSSION     Participants: Dr. Russo, nurses, social workers, psych asst  Progress: Pt is present and pleasant with staff. Pt is awaiting for [facility] treatment.   Continued Stay Criteria/Rationale: Wait to hear back from [facility] treatment facility, seeing placement.   Medical/Physical: N/A  Precautions:   Behavioral Orders   Procedures     Code 1 - Restrict to Unit     Routine Programming     As clinically indicated     Status 15     Every 15 minutes.     Plan: Continue treatment and meds for pt, wait for [facility] response.  Rationale for change in precautions or plan: No changes being made for meds, continue treatment and observing pt.

## 2018-05-22 PROCEDURE — 90853 GROUP PSYCHOTHERAPY: CPT

## 2018-05-22 PROCEDURE — 12400006 ZZH R&B MH INTERMEDIATE

## 2018-05-22 PROCEDURE — 25000132 ZZH RX MED GY IP 250 OP 250 PS 637: Performed by: PSYCHIATRY & NEUROLOGY

## 2018-05-22 RX ORDER — ACETAMINOPHEN 325 MG/1
650 TABLET ORAL EVERY 4 HOURS PRN
Status: DISCONTINUED | OUTPATIENT
Start: 2018-05-22 | End: 2018-05-23 | Stop reason: HOSPADM

## 2018-05-22 RX ADMIN — ACETAMINOPHEN 650 MG: 325 TABLET ORAL at 14:40

## 2018-05-22 RX ADMIN — ABACAVIR SULFATE, DOLUTEGRAVIR SODIUM, LAMIVUDINE 1 TABLET: 600; 50; 300 TABLET, FILM COATED ORAL at 08:37

## 2018-05-22 RX ADMIN — MIRTAZAPINE 15 MG: 15 TABLET, FILM COATED ORAL at 21:20

## 2018-05-22 ASSESSMENT — ACTIVITIES OF DAILY LIVING (ADL)
DRESS: INDEPENDENT
GROOMING: INDEPENDENT
LAUNDRY: WITH SUPERVISION
ORAL_HYGIENE: INDEPENDENT

## 2018-05-22 NOTE — PROGRESS NOTES
received a call this morning from Harper Lee who is the treatment coordinator at [facility]. She stated that she handles all intakes for the program. She stated that they did not have a copy of the chemical dependency assessment that was done last week, so requested that it be faxed over. She stated that she had spoken with Adela Bradford ([phone]), who is the director of the program, earlier today. The director had spoken with patient and indicated that there is a bed available tomorrow. Patient apparently had been in this program four years ago so is known to them.  faxed over the requested assessment.  met with patient this morning to update him. He voiced that he is ready to start treatment as soon as possible.  will update patient once [facility] has made a final decision as to whether they will accept him for treatment.      received a call from Krysta at [facility] stating that she has received the referral and that [facility] should be calling once all records have been reviewed. They will work on insurance approval as well. She stated that if  has any further questions that calls can be directed to [facility] at [phone].

## 2018-05-22 NOTE — PLAN OF CARE
Problem: Depressive Symptoms  Goal: Depressive Symptoms  Signs and symptoms of listed problems will be absent or manageable.   Outcome: Improving  Patient is in bright mood.  He is hoping for discharge to Chemical dependency treatment tomorrow.  Called discharge pharmacy and they ordered and have received his medication. Dr. Russo has not ordered the medication as of yet.

## 2018-05-22 NOTE — PROGRESS NOTES
[facility] Psychiatric Progress Note      Interval History:   Pt seen, team meeting held with , nursing staff, OT, and PA's to review diagnosis and treatment plan.  Patient reports he is more awake and alert today. He reports feeling more like himself. He says he is waiting for a call back from the treatment center because he was told they may have a bed before the weekend.  Denies suicidal or homicidal ideation. Tolerating medications well without significant side effects.      Review of systems:   The Review of Systems is negative other than noted in the HPI     Medications:       abacavir-dolutegravir-LamiVUDine  1 tablet Oral QAM     mirtazapine  15 mg Oral At Bedtime     hydrOXYzine, QUEtiapine    Mental Status Examination:     Appearance:  awake, drowsy, adequately groomed and casually dressed  Eye Contact:  better  Speech:  clear, coherent  Psychomotor Behavior:  no evidence of tardive dyskinesia, dystonia, or tics and fidgeting  Mood:  better   Affect:  Mood congruent with restricted range  Thought Process:  logical, linear and goal oriented no loose associations  Thought Content:  no evidence of suicidal ideation or homicidal ideation.  No evidence of psychosis.  Oriented to:  time, person, and place  Attention Span and Concentration:  Good  Recent and Remote Memory:  Good  Fund of Knowledge: appropriate  Muscle Strength and Tone: normal  Gait and Station: Normal  Insight:  fair  Judgment:  limited          Labs/Vitals:     No results found for this or any previous visit (from the past 24 hour(s)).  B/P: 110/76, T: 97.7, P: Data Unavailable, R: 14    Impression:   Sd Gee is a 43-year-old man who is in a 4-year relationship with his partner and is currently employed, who presents to the hospital on account of worsening depression in the context of abusing methamphetamines and alcohol.  He is requesting help, because he recently was contemplating suicide.        DIagnoses:     1.  Major depressive disorder, recurrent, moderate.   2.  Alcohol use disorder.   3.  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   4.  Human immunodeficiency virus, on HAART, CD4 count of 628 as of 4/20/18.          Plan:   1. Written information given on medications. Side effects, risks, benefits reviewed.  2.  Continue hospitalization. Awaiting CD treatment.      Attestation:  Patient has been seen and evaluated by Rebecca hays MD    PATIENT ID  Name: Sd Gee  MRN:[id number]  YOB: 1975

## 2018-05-22 NOTE — PLAN OF CARE
Problem: General Plan of Care (Inpatient Behavioral)  Goal: Discharge Planning  Attended proces group. Participation complete and appropriate. Demonstrated insight.

## 2018-05-22 NOTE — PLAN OF CARE
Problem: Depressive Symptoms  Goal: Depressive Symptoms  Signs and symptoms of listed problems will be absent or manageable.   Outcome: Improving  Pt was active and pleasant within the SDU. Pt presents with a full range affect and calm mood. Pt spent the majority of the shift in the lounge or hallway. Pt was actively social and interactive with staff and peers. Pt is warm upon approach and was joking laughing appropriately. Pt stated that he does find himself in a good place right now and feels supported. Pt states he does feel ready for discharge and just waiting for his med to arrive. Pt ate all of his food and was med compliant.

## 2018-05-23 VITALS
HEART RATE: 106 BPM | BODY MASS INDEX: 27.4 KG/M2 | SYSTOLIC BLOOD PRESSURE: 110 MMHG | OXYGEN SATURATION: 97 % | TEMPERATURE: 98 F | HEIGHT: 70 IN | DIASTOLIC BLOOD PRESSURE: 79 MMHG | RESPIRATION RATE: 16 BRPM | WEIGHT: 191.4 LBS

## 2018-05-23 PROCEDURE — 25000132 ZZH RX MED GY IP 250 OP 250 PS 637: Performed by: PSYCHIATRY & NEUROLOGY

## 2018-05-23 PROCEDURE — 90853 GROUP PSYCHOTHERAPY: CPT

## 2018-05-23 RX ORDER — MIRTAZAPINE 15 MG/1
15 TABLET, FILM COATED ORAL AT BEDTIME
Qty: 30 TABLET | Refills: 0 | Status: SHIPPED | OUTPATIENT
Start: 2018-05-23 | End: 2018-11-01

## 2018-05-23 RX ADMIN — ABACAVIR SULFATE, DOLUTEGRAVIR SODIUM, LAMIVUDINE 1 TABLET: 600; 50; 300 TABLET, FILM COATED ORAL at 08:59

## 2018-05-23 NOTE — PROGRESS NOTES
Pt presents with a full range affect and a calm mood. Pt attended groups and was bright and social on the unit socializing with staff and pts. Pt states that he feels positive about going to [facility] for CD treatment.  has fax the required CD assessment papers to [facility] who accepted pt. Tstaed they have one bed open and will inform case manger whenever insurance goes through by weekend. Pt is excited that he might be going to the treatment center that he is familiar with.

## 2018-05-23 NOTE — PROGRESS NOTES
Patient denies suicidal ideation. Reviewed discharge instructions with patient . Patient has a copy of discharge instructions and verbalizes understanding of them.  [facility] 30 day supply of meds which are with his belongings.

## 2018-05-23 NOTE — PROGRESS NOTES
[facility] Psychiatric Progress Note      Interval History:   Pt seen, team meeting held with , nursing staff, OT, and PA's to review diagnosis and treatment plan.  Patient reports he slept very well last night and feels he is ready for CD treatment. He says he was able to connect the director of [facility] with the unit  yesterday. He says he is aware that his CD assessment was faxed over to the facility. He denies self harm thoughts, plans or intent. He says he is tolerating his medications and denies experiencing any side effects. He is awaiting insurance approval for his treatment.     Review of systems:   The Review of Systems is negative other than noted in the HPI     Medications:       abacavir-dolutegravir-LamiVUDine  1 tablet Oral QAM     mirtazapine  15 mg Oral At Bedtime     acetaminophen, hydrOXYzine, QUEtiapine    Mental Status Examination:     Appearance:  awake, drowsy, adequately groomed and casually dressed  Eye Contact:  better  Speech:  clear, coherent  Psychomotor Behavior:  no evidence of tardive dyskinesia, dystonia, or tics and fidgeting  Mood:  better   Affect:  Mood congruent with restricted range  Thought Process:  logical, linear and goal oriented no loose associations  Thought Content:  no evidence of suicidal ideation or homicidal ideation.  No evidence of psychosis.  Oriented to:  time, person, and place  Attention Span and Concentration:  Good  Recent and Remote Memory:  Good  Fund of Knowledge: appropriate  Muscle Strength and Tone: normal  Gait and Station: Normal  Insight:  fair  Judgment:  Improved          Labs/Vitals:     No results found for this or any previous visit (from the past 24 hour(s)).  B/P: 110/76, T: 97.7, P: Data Unavailable, R: 14    Impression:   Sd Gee is a 43-year-old man who is in a 4-year relationship with his partner and is currently employed, who presents to the hospital on account of worsening depression in  the context of abusing methamphetamines and alcohol.  He is requesting help, because he recently was contemplating suicide.       DIagnoses:     1.  Major depressive disorder, recurrent, moderate.   2.  Alcohol use disorder.   3.  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   4.  Human immunodeficiency virus, on HAART, CD4 count of 628 as of 4/20/18.          Plan:   1. Written information given on medications. Side effects, risks, benefits reviewed.  2.  Continue hospitalization. Awaiting CD treatment approval by insurance.      Attestation:  Patient has been seen and evaluated by Rebecca hays MD    PATIENT ID  Name: Sd Gee  MRN:[id number]  YOB: 1975

## 2018-05-23 NOTE — PROGRESS NOTES
Pt left [address] with all belongings, discharge instructions, and medications accompanied by a staff member at 16:55 to wait for ride to [facility]. Van arrived and [facility] at 17:50. Discharge complete.

## 2018-05-23 NOTE — PLAN OF CARE
Problem: Patient Care Overview  Goal: Discharge Needs Assessment   received a message from Harper sterling [facility] ([phone])  that the referral has been sent to patient's insurance company and is awaiting approval.  awaiting confirmation of acceptance into [facility] for patient.      called and left a message with [facility] this afternoon to see if there is any news yet on insurance approval.  awaiting callback.      received a call from Harper at [facility] this afternoon confirming that patient has been accepted into the [facility] chemical dependency treatment program. She received word this morning that insurance had approved patient for this treatment program. She stated that a bed is available today, and they will plan to  patient from the hospital at 1700. They will call prior to arrival and request that staff walk patient down to door 6 for .  met with patient to notify him of the discharge plan and he is in agreement with going to treatment today.

## 2018-05-23 NOTE — DISCHARGE INSTRUCTIONS
Behavioral Discharge Planning and Instructions    Summary:  Admitted for suicidal ideation.     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     Major Treatments, Procedures and Findings:  Psychiatric assessment. Medication adjustment.     Symptoms to Report: Feeling more agitated, Increased confusion, Losing more sleep, Mood getting worse or Thoughts of suicide    Lifestyle Adjustment:  Follow all treatment recommendations. Develop and follow safety plans.  Abstain from the use of all mood-altering substances, including alcohol, as usage may increase symptoms of depression. Maintain sobriety by attending daily AA or NA meetings and obtaining a sponsor.     Psychiatry Follow-up:     You have an appointment for medication management with Dr. Alex Cifuentes at [facility] in [place] on Tuesday, June 19, 2018 at 2:00 pm. This appointment will be at their temporary location in [facility].  Please arrive 15-20 minutes early to complete paperwork. Please bring your insurance card and medication list with you to this appointment. You can discuss a referral for therapy at this appointment.     [facility] (Temporary Address)  [address] [place] [id number]  [phone] / [phone] fax    You are discharging to [facility] for chemical dependency treatment.     [facility]  [address] [place] [address]  [phone] / [phone] / [phone] fax    Resources:   Crisis Intervention: 630.953.8719 or 844-121-2493 (TTY: 125.357.3278).  Call anytime for help.  National [place] on Mental Illness (www.mn.[place].org): 682.334.2448 or 140-514-6660.  Alcoholics Anonymous (www.alcoholics-anonymous.org): Check your phone book for your local  chapter.  National Suicide Prevention Line (www.mentalhealthmn.org): 386-645-QROV (3634)  COPE (Crisis Services for Adults) in [facility]: 838.346.6130 (Available 24/7)    General Medication Instructions:   See your medication sheet(s) for instructions.   Take all medicines as directed.  Make no changes unless your doctor suggests them.   Go to all your doctor visits.  Be sure to have all your required lab tests. This way, your medicines can be refilled on time.  Do not use any drugs not prescribed by your doctor.  Avoid alcohol.

## 2018-05-23 NOTE — PLAN OF CARE
Problem: Depressive Symptoms  Goal: Depressive Symptoms  Signs and symptoms of listed problems will be absent or manageable.   Outcome: Improving  Patient denies suicidal ideation. Patient is bright in mood and waiting to go to treatment. He said his insurance went through and is waiting for us to set up the discharge.

## 2018-05-26 NOTE — DISCHARGE SUMMARY
"Admit Date:     05/16/2018   Discharge Date:     05/23/2018      DISCHARGE DIAGNOSES:   1.  Major depressive disorder, recurrent.   2.  Stimulant use disorder (amphetamines) with stimulant-induced psychotic disorder.   3.  Alcohol use disorder.   4.  Human immunodeficiency virus on HAART, CD4 count of 628 as of 04/20/2018.      REASON FOR REFERRAL:  This is a 43-year-old man who reports he is single but in a relationship with his partner for years.  He states that they are currently having a dimitris situation in their relationship.  He resides with his partner in [place].  He states he works as a  for a local [employer] and has held that job for the last 2 years.  He presented to the hospital on account of suicidality in the context of increased amphetamine abuse.  He was admitted as a voluntary patient.      CHIEF COMPLAINT:  \"I am playing [employer] with my life right now and I wanted it to stop.\"      HISTORY OF PRESENT ILLNESS:  I refer the reader to the psychiatric evaluation documented on 05/16/2018 by Rebecca Hamm MD in addition to the history and physical examination documented on 05/16/2018 by Deanne Zepeda PA-C and attested by Jonny Mas MD.   I also refer the reader to the social history documented by Justa Hoyt,  on 05/21/2018.      HOSPITAL COURSE:  Following admission to the mental health unit, the patient was introduced to the milieu and encouraged to participate in individual milieu and group therapy.  He was very open to receiving help and displayed significant insight into his chemical use behavior and the consequent mental health symptoms.  He expressed interest in pursuing sobriety and stabilization of his mood symptoms.  He admitted that he had been abusing methamphetamines, which resulted in prolonged periods of decreased need for sleep, as well as significant paranoia and hallucinations.  He was concerned that his use of alcohol and amphetamines " were placing him at risk of developing resistance to his antiretroviral medication which he had enough inside to recognize was a death sentence for him.  He therefore consented to completing a chemical use assessment and agreed to medication management of his symptoms.  He was offered mirtazapine at 15 mg at bedtime while olanzapine was dosed at 5 mg b.i.d.; however, he found the olanzapine to be too sedating and this was ultimately dosed only at bedtime.  His psychosis cleared quickly and he began to report improved sleep.  He experienced significant body aches for a couple days, but he continued to push through his discomfort and participated diligently in individual milieu and group therapy.  He underwent chemical use assessment with Kristi MORALES on 05/18/2018 and was advised that he was likely to be admitted to a residential treatment facility sooner than later, given the season.  It was recommended that he pursue residential/inpatient treatment program such as [facility] and the patient accepted this referral.  He was proactive in calling the facility and secured an interview with the director of the facility and did sign a release of information for his records to be made available to the facility.  Within a couple days he was accepted for admission at which point he was stable enough to be discharged from the hospital.  He was therefore discharged from the hospital on 05/23/2018.      DISCHARGE MEDICATIONS:     1.  Triumeq 600- one p.o. q. a.m.   2.  Remeron 15 mg p.o. each day at bedtime.      DISPOSITION:  The patient was discharged to follow up with Dr. Alex Cifuentes at [facility] on Tuesday, 06/19/2018 at 2:00 p.m.  He was discharged to [facility] residential treatment facility for his chemical dependency treatment.      TIME SPENT:  35 minutes with more than 50% of the time spent in counseling and care coordination.         JOSEPH SON  MD FLIP             D: 2018   T: 2018   MT: FRANCHESCA      Name:     CHRISTIANA LLANES   MRN:      [id number]        Account:        [id number]   :      1975           Admit Date:     2018                                  Discharge Date: 2018      Document: [id number]

## 2018-11-01 ENCOUNTER — OFFICE VISIT (OUTPATIENT)
Dept: FAMILY MEDICINE | Facility: CLINIC | Age: 43
End: 2018-11-01
Payer: COMMERCIAL

## 2018-11-01 VITALS
DIASTOLIC BLOOD PRESSURE: 81 MMHG | TEMPERATURE: 98.4 F | HEIGHT: 69 IN | WEIGHT: 192 LBS | BODY MASS INDEX: 28.44 KG/M2 | HEART RATE: 96 BPM | RESPIRATION RATE: 16 BRPM | SYSTOLIC BLOOD PRESSURE: 122 MMHG | OXYGEN SATURATION: 97 %

## 2018-11-01 DIAGNOSIS — Z00.01 ENCOUNTER FOR PREVENTATIVE ADULT HEALTH CARE EXAM WITH ABNORMAL FINDINGS: Primary | ICD-10-CM

## 2018-11-01 DIAGNOSIS — Z21 ASYMPTOMATIC HUMAN IMMUNODEFICIENCY VIRUS (HIV) INFECTION STATUS (H): ICD-10-CM

## 2018-11-01 DIAGNOSIS — F15.10 METHAMPHETAMINE ABUSE (H): ICD-10-CM

## 2018-11-01 LAB
ALBUMIN SERPL-MCNC: 3.6 G/DL (ref 3.4–5)
ALP SERPL-CCNC: 70 U/L (ref 40–150)
ALT SERPL W P-5'-P-CCNC: 22 U/L (ref 0–70)
ANION GAP SERPL CALCULATED.3IONS-SCNC: 6 MMOL/L (ref 3–14)
AST SERPL W P-5'-P-CCNC: 18 U/L (ref 0–45)
BILIRUB SERPL-MCNC: 0.4 MG/DL (ref 0.2–1.3)
BUN SERPL-MCNC: 12 MG/DL (ref 7–30)
CALCIUM SERPL-MCNC: 8.8 MG/DL (ref 8.5–10.1)
CHLORIDE SERPL-SCNC: 106 MMOL/L (ref 94–109)
CHOLEST SERPL-MCNC: 151 MG/DL
CO2 SERPL-SCNC: 28 MMOL/L (ref 20–32)
CREAT SERPL-MCNC: 1.15 MG/DL (ref 0.66–1.25)
GFR SERPL CREATININE-BSD FRML MDRD: 70 ML/MIN/1.7M2
GLUCOSE SERPL-MCNC: 94 MG/DL (ref 70–99)
HBA1C MFR BLD: 5.8 % (ref 4.1–5.7)
HDLC SERPL-MCNC: 40 MG/DL
LDLC SERPL CALC-MCNC: 91 MG/DL
NONHDLC SERPL-MCNC: 111 MG/DL
POTASSIUM SERPL-SCNC: 4.1 MMOL/L (ref 3.4–5.3)
PROT SERPL-MCNC: 7.8 G/DL (ref 6.8–8.8)
SODIUM SERPL-SCNC: 140 MMOL/L (ref 133–144)
TRIGL SERPL-MCNC: 97 MG/DL

## 2018-11-01 ASSESSMENT — ANXIETY QUESTIONNAIRES
IF YOU CHECKED OFF ANY PROBLEMS ON THIS QUESTIONNAIRE, HOW DIFFICULT HAVE THESE PROBLEMS MADE IT FOR YOU TO DO YOUR WORK, TAKE CARE OF THINGS AT HOME, OR GET ALONG WITH OTHER PEOPLE: NOT DIFFICULT AT ALL
2. NOT BEING ABLE TO STOP OR CONTROL WORRYING: NOT AT ALL
GAD7 TOTAL SCORE: 2
1. FEELING NERVOUS, ANXIOUS, OR ON EDGE: SEVERAL DAYS
3. WORRYING TOO MUCH ABOUT DIFFERENT THINGS: SEVERAL DAYS
5. BEING SO RESTLESS THAT IT IS HARD TO SIT STILL: NOT AT ALL
7. FEELING AFRAID AS IF SOMETHING AWFUL MIGHT HAPPEN: NOT AT ALL
6. BECOMING EASILY ANNOYED OR IRRITABLE: NOT AT ALL

## 2018-11-01 ASSESSMENT — PATIENT HEALTH QUESTIONNAIRE - PHQ9
SUM OF ALL RESPONSES TO PHQ QUESTIONS 1-9: 0
5. POOR APPETITE OR OVEREATING: NOT AT ALL

## 2018-11-01 NOTE — PROGRESS NOTES
SUBJECTIVE:   CC: Sd Gee is an 43 year old male who presents for preventative health visit. Sd needs an admission physical to [facility] chemical dependency treatment facility and was admitted 10/22/18. Patient has a history of chemical dependency to methamphetamine (inhalation), clean date 8/24/18. Patient has been abusing meth, mostly inhalation, since age 33.     HIV, asymptomatic:   Sd was diagnosed with HIV in 2005. He started on medication 1 month after diagnosis and has reportedly had undetectable CD4 count since that time. Is currently managed on Triumeq. He has historically been followed for his HIV in [place], [place] at [facility]. Reports getting refills on Triumeq every 4-5 months and obtained a refill just prior to his admission to [facility]. He normally has lab work completed about once every 6 months. Sd reports he is up to date on all vaccines including hepatitis, however this information is not uploaded into [facility]. DEREK signed today to obtain records from [place]. Sd plans to stay in the [facility] after he completed treatment and desires to establish care at an HIV clinic in the [place]. He thinks he has been seen before at [facility]     Healthy Habits:    Do you get at least three servings of calcium containing foods daily (dairy, green leafy vegetables, etc.)? Yes.    Amount of exercise or daily activities, outside of work: walking, running, every morning before work.     Have you had an eye exam in the past two years? No, no concerns.    Do you see a dentist twice per year? Yes    Do you have sleep apnea, excessive snoring or daytime drowsiness? No    Sleep: 6 hours/night.     Caffeine: Mostly tea and water, rare caffeine    Water: gallon/day    Past Medical History:   Diagnosis Date     Asymptomatic human immunodeficiency virus (HIV) infection status (H) 2005     Methamphetamine abuse (H)      History reviewed. No pertinent surgical  history.  Family History   Problem Relation Age of Onset     Aneurysm Mother      brain aneurysm causing death (2015)     Cirrhosis Father      Alcoholism Father      Alcoholism Sister      Substance Abuse Brother      Cocaine     Cerebrovascular Disease Maternal Grandmother      No Known Problems Maternal Grandfather      No Known Problems Paternal Grandmother      Cancer Paternal Grandfather      Suicide Maternal Aunt      Social History     Social History     Marital status: Single     Spouse name: N/A     Number of children: 0     Years of education: 16     Occupational History      General Assistant [employer]     Social History Main Topics     Smoking status: Never Smoker     Smokeless tobacco: Never Used     Alcohol use 0.6 oz/week     1 Glasses of wine per week      Comment: Socially, 1 drink once weekly     Drug use: Yes     Special: Methamphetamines      Comment: Clean date 8/24/18     Sexual activity: Not Currently     Partners: Male     Other Topics Concern     Not on file     Social History Narrative    Sd grew up in [place], [place]. No kids, not . Sd has a degree in marketing and accounting from  [employer]. After college, he went back to school at [employer] for [employer] management. 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      Today's PHQ-2 Score:   PHQ-2 ( 1999 Pfizer) 11/1/2018   Q1: Little interest or pleasure in doing things 0     Today's PHQ-9 Score:   PHQ-9 SCORE 11/1/2018   Total Score 0     Today's SHALA-7 Score:   SHALA-7 SCORE 11/1/2018   Total Score 2     Abuse: Current or Past (Physical, Sexual or Emotional) - No  Do you feel safe in your environment - Yes    If you drink alcohol do you typically have >3 drinks per day or >7 drinks per week? No                      Last PSA: No results found for: PSA    Reviewed orders with patient. Reviewed health maintenance and updated orders accordingly -  "Yes    Reviewed and updated as needed this visit by clinical staff    Reviewed and updated as needed this visit by Provider    ROS:  CONSTITUTIONAL: NEGATIVE for fever, chills, change in weight  INTEGUMENTARY/SKIN: NEGATIVE for worrisome rashes, moles or lesions  EYES: NEGATIVE for vision changes or irritation  ENT: NEGATIVE for ear, mouth and throat problems  RESP: NEGATIVE for significant cough or SOB  CV: NEGATIVE for chest pain, palpitations or peripheral edema  GI: NEGATIVE for nausea, abdominal pain, heartburn, or change in bowel habits   male: negative for dysuria, hematuria, decreased urinary stream, erectile dysfunction, urethral discharge  MUSCULOSKELETAL: NEGATIVE for significant arthralgias or myalgia  NEURO: NEGATIVE for weakness, dizziness or paresthesias  PSYCHIATRIC: NEGATIVE for changes in mood or affect    OBJECTIVE:   /81 (BP Location: Left arm, Patient Position: Chair, Cuff Size: Adult Regular)  Pulse 96  Temp 98.4  F (36.9  C) (Oral)  Resp 16  Ht 5' 9.3\" (176 cm)  Wt 192 lb (87.1 kg)  SpO2 97%  BMI 28.11 kg/m2     EXAM:  Constitutional: appears to be in no acute distress, comfortable, pleasant.   Eyes: anicteric, conjunctiva clear without drainage or erythema. LEXIS, red reflex present bilaterally.   Ears, Nose and Throat: tympanic membranes gray with LR,  nose without nasal discharge. OP: no erythema to posterior pharynx, negative post nasal drainage, tonsils +1 no erythema or exudate.  Neck: supple, thyroid palpable,not enlarged, no nodules   Cardiovascular: regular rate and rhythm, normal S1 and S2, no murmurs, rubs or gallops, peripheral pulses full and symmetric; negative peripheral edema   Respiratory: Air entry throughout. Breathing pattern unlabored without the use of accessory muscles. Clear to auscultation A and P, no wheezes or crackles, normal breath sounds.    Gastrointestinal: rounded, soft. Positive bowel sounds x4, nontender, no masses.   Musculoskeletal: full range " of motion, no edema.   Skin: pink, turgor smooth and elastic. Negative for lesions or dryness.  Neurological: normal gait, no tremor.   Psychological: appropriate mood and affect.   Lymphatic: no cervical, axillary, supraclavicular, or infraclavicular lymphadenopathy.    Laboratory results pending:  Lipid panel reflex to direct LDL fasting  Hemoglobin A1c  Comprehensive metabolic panel    ASSESSMENT/PLAN:     1. Encounter for preventative adult health care exam with abnormal findings  Comment: Pt reports he received seasonal influenza vaccine this year prior to admission to [facility]. Last tetanus per [facility] in 2008, however pt reports tetanus shot in 2012. He reports he is up to date on vaccines, however no records at this time. DEREK signed to obtain records from [facility] in [place], [place]. Pt declines STI testing today. Body mass index is 28.11 kg/(m^2). Will complete Hgb A1c today. Unsure if he has ever had cholesterol checked, agreeable to screening today.   Plan:   - Lipid panel reflex to direct LDL Fasting   - Hemoglobin A1c ([facility])   - Comprehensive metabolic panel   - Routine dental visits every 6 months   - Goal for 150 minutes physical exercise/week    2. Methamphetamine abuse (H)  Comment: Admitted to [facility] chemical dependency treatment facility 10/22/18. Pt with hx of chemical dependency to methamphetamine (inhalation), clean date 8/24/18. Patient has been abusing meth, mostly inhalation, since age 33. Pt encouraged in his sobriety.   Plan:   - Continue treatment at [facility]   - Comprehensive metabolic panel    3. Asymptomatic human immunodeficiency virus (HIV) infection status (H)  Comment: Stable. Diagnosed with HIV in 2005, started on medication 1 month after diagnosis and has reportedly had undetectable CD4 count since that time. Is currently managed on Triumeq. Historically been followed for his HIV in [place], [place] at [facility]. Reports getting refills on Triumeq every 4-5 months  and obtained a refill just prior to admission to  [facility]. Normally has lab work completed about once every 6 months. Sd reports he is up to date on all vaccines including hepatitis, however this information is not uploaded into [facility]. DEREK signed today to obtain records from [place]. Sd plans to stay in the [facility] after he completed treatment and desires to establish care at an HIV clinic in the [place]. He thinks he has been seen before at the [facility]   Plan:   - Referral to [facility] [place] [facility]      Follow-up: As needed.       COUNSELING:  Reviewed preventive health counseling, as reflected in patient instructions  Special attention given to:        Regular exercise       Healthy diet/nutrition       Vision screening       Immunizations       Safe sex practices/STD prevention    Patient has never smoked cigarettes.     Body mass index is 28.11 kg/(m^2).    Weight management plan: Discussed healthy diet and exercise guidelines and patient will follow up in 12 months in clinic to re-evaluate.    Counseling Resources:  ATP IV Guidelines  Pooled Cohorts Equation Calculator  FRAX Risk Assessment  ICSI Preventive Guidelines  Dietary Guidelines for Americans, 2010  USDA's MyPlate  ASA Prophylaxis  Lung CA Screening    BALBINA Gary CNP  [facility]

## 2018-11-01 NOTE — LETTER
"November 2, 2018      Sd Gee  [address] [address]        Dear ,    We are writing to inform you of your test results. Your comprehensive metabolic panel and lipid panel were normal. Your hemoglobin A1C was slightly elevated. This was the lab we checked to screen for diabetes. We want it to remain below 6.5%. Technically 5.7%-6.4% is considered \"pre-diabetes.\" On the other hand, I was happy to see your fasting blood sugar was normal, which is reassuring. For now, I recommend continuing with your good exercise habits (goal for 150 minutes/week) and your diet. No medication is warranted at this time and we will continue to monitor this. Please let nurse Lillie know if you would like to talk about this more in person, she can set you up an appointment with me.     Resulted Orders   Lipid panel reflex to direct LDL Fasting   Result Value Ref Range    Cholesterol 151 <200 mg/dL    Triglycerides 97 <150 mg/dL    HDL Cholesterol 40 >39 mg/dL    LDL Cholesterol Calculated 91 <100 mg/dL      Comment:      Desirable:       <100 mg/dl    Non HDL Cholesterol 111 <130 mg/dL   Hemoglobin A1c ([facility])   Result Value Ref Range    Hemoglobin A1C 5.8 (H) 4.1 - 5.7 %   Comprehensive metabolic panel   Result Value Ref Range    Sodium 140 133 - 144 mmol/L    Potassium 4.1 3.4 - 5.3 mmol/L    Chloride 106 94 - 109 mmol/L    Carbon Dioxide 28 20 - 32 mmol/L    Anion Gap 6 3 - 14 mmol/L    Glucose 94 70 - 99 mg/dL    Urea Nitrogen 12 7 - 30 mg/dL    Creatinine 1.15 0.66 - 1.25 mg/dL    GFR Estimate 70 >60 mL/min/1.7m2      Comment:      Non  GFR Calc    GFR Estimate If Black 84 >60 mL/min/1.7m2      Comment:       GFR Calc    Calcium 8.8 8.5 - 10.1 mg/dL    Bilirubin Total 0.4 0.2 - 1.3 mg/dL    Albumin 3.6 3.4 - 5.0 g/dL    Protein Total 7.8 6.8 - 8.8 g/dL    Alkaline Phosphatase 70 40 - 150 U/L    ALT 22 0 - 70 U/L    AST 18 0 - 45 U/L       If you " have any questions or concerns, please call the clinic at the number listed above.       Sincerely,        BALBINA Gary CNP

## 2018-11-01 NOTE — NURSING NOTE
"43 year old  Chief Complaint   Patient presents with     Physical     Pt. presents to the clinic today from [facility] for a physical exam.        Blood pressure 122/81, pulse 96, temperature 98.4  F (36.9  C), temperature source Oral, resp. rate 16, height 5' 9.3\" (176 cm), weight 192 lb (87.1 kg), SpO2 97 %. Body mass index is 28.11 kg/(m^2).  BP completed using cuff size:    Patient Active Problem List   Diagnosis     Major depressive disorder, recurrent, severe without psychotic features (H)     Alcohol use disorder, severe, dependence (H)     Suicidal ideation       Wt Readings from Last 2 Encounters:   11/01/18 192 lb (87.1 kg)   05/22/18 191 lb 6.4 oz (86.8 kg)     BP Readings from Last 3 Encounters:   11/01/18 122/81   05/23/18 110/79   06/12/15 105/79       No Known Allergies    Current Outpatient Prescriptions   Medication     abacavir-dolutegravir-LamiVUDine (TRIUMEQ) 600- MG per tablet     mirtazapine (REMERON) 15 MG tablet     No current facility-administered medications for this visit.        Social History   Substance Use Topics     Smoking status: Never Smoker     Smokeless tobacco: Never Used     Alcohol use Yes      Comment: daily         Honoring Choices - Health Care Directive Guide offered to patient at time of visit.    Health Maintenance Due   Topic Date Due     TETANUS IMMUNIZATION (SYSTEM ASSIGNED)  03/21/1994     HIV SCREEN (SYSTEM ASSIGNED)  03/21/1994     LIPID SCREEN Q5 YR MALE (SYSTEM ASSIGNED)  03/21/2011     INFLUENZA VACCINE (1) 09/01/2018         There is no immunization history on file for this patient.    No results found for: PAP      Recent Labs   Lab Test  05/16/18   1148  06/13/15   0746  06/12/15   0800   ALT  23   --   33   CR  1.15  1.23  1.29*   GFRESTIMATED  70  65  62   GFRESTBLACK  84  79  75   ALBUMIN  3.7   --   3.7   POTASSIUM  3.4   --   3.9   TSH  1.26   --   2.03       PHQ-2 ( 1999 Pfizer) 11/1/2018   Q1: Little interest or pleasure in doing things 0 "       PHQ-9 SCORE 11/1/2018   Total Score 0       SHALA-7 SCORE 11/1/2018   Total Score 2       No flowsheet data found.    Eli Reyes CMA  November 1, 2018 8:06 AM

## 2018-11-01 NOTE — PATIENT INSTRUCTIONS
1) Lab work today. Results pending. Will mail you a letter with your results if everything is normal. If anything is abnormal, I will be in contact with you through nurse Moreira.     2) Referral to [facility] [place] - Infectious Disease [facility]  [facility]  [facility], [address]  [address], [address]    Clinic hours:    8 am - 4:30 pm Monday - Friday    Appointments: [phone]

## 2018-11-01 NOTE — MR AVS SNAPSHOT
After Visit Summary   2018    Sd Gee    MRN: [id number]           Patient Information     Date Of Birth          1975        Visit Information        Provider Department      2018 8:00 AM Blanca Reddy APRN CNP [facility] [facility] [employer]        Today's Diagnoses     Encounter for preventative adult health care exam with abnormal findings    -  1    Methamphetamine abuse (H)        Asymptomatic human immunodeficiency virus (HIV) infection status (H)          Care Instructions    1) Lab work today. Results pending. Will mail you a letter with your results if everything is normal. If anything is abnormal, I will be in contact with you through nurse Moreira.     2) Referral to [facility] [place] [place] - Infectious Disease Positive Care  [facility]  [facility], [address]  [address], [address]    Clinic hours:    8 am - 4:30 pm Monday - Friday    Appointments: [phone]              Follow-ups after your visit        Who to contact     Please call your clinic at [phone] to:    Ask questions about your health    Make or cancel appointments    Discuss your medicines    Learn about your test results    Speak to your doctor            Additional Information About Your Visit        MyChart Information     [employer] is an electronic gateway that provides easy, online access to your medical records. With [employer], you can request a clinic appointment, read your test results, renew a prescription or communicate with your care team.     To sign up for [employer] visit the website at [email or web address]   You will be asked to enter the access code listed below, as well as some personal information. Please follow the directions to create your username and password.     Your access code is: [email or web address]  Expires: 2019  9:06 AM     Your access code will  in 90 days. If you need help or a new code, please contact your [facility]  "Clinic or call [phone] for assistance.        Care EveryWhere ID     This is your Care EveryWhere ID. This could be used by other organizations to access your [place] medical records  [id number]        Your Vitals Were     Pulse Temperature Respirations Height Pulse Oximetry BMI (Body Mass Index)    96 98.4  F (36.9  C) (Oral) 16 5' 9.3\" (176 cm) 97% 28.11 kg/m2       Blood Pressure from Last 3 Encounters:   11/01/18 122/81   05/23/18 110/79   06/12/15 105/79    Weight from Last 3 Encounters:   11/01/18 192 lb (87.1 kg)   05/22/18 191 lb 6.4 oz (86.8 kg)   06/14/15 184 lb (83.5 kg)              We Performed the Following     Comprehensive metabolic panel     Hemoglobin A1c ([facility])     Lipid panel reflex to direct LDL Fasting        Primary Care Provider Fax #    Provider Not In System [phone]                Equal Access to Services     ALL CALLAHAN : Hadii ivis weiner Sobharat, wadavonteda lusteven, qaybta kaalmada lois, sourav hilton . So [facility] [phone].    ATENCIÓN: Si habla español, tiene a pickard disposición servicios gratuitos de asistencia lingüística. Llame al [phone].    We comply with applicable federal civil rights laws and [place] laws. We do not discriminate on the basis of race, color, national origin, age, disability, sex, sexual orientation, or gender identity.            Thank you!     Thank you for choosing [facility]  for your care. Our goal is always to provide you with excellent care. Hearing back from our patients is one way we can continue to improve our services. Please take a few minutes to complete the written survey that you may receive in the mail after your visit with us. Thank you!             Your Updated Medication List - Protect others around you: Learn how to safely use, store and throw away your medicines at www.disposemymeds.org.          This list is accurate as of 11/1/18  9:06 AM.  Always use your most recent " med list.                   Brand Name Dispense Instructions for use Diagnosis    mirtazapine 15 MG tablet    REMERON    30 tablet    Take 1 tablet (15 mg) by mouth At Bedtime    Major depressive disorder, recurrent, severe without psychotic features (H)       TRIUMEQ 600- MG per tablet   Generic drug:  abacavir-dolutegravir-LamiVUDine      Take 1 tablet by mouth daily

## 2018-11-02 ASSESSMENT — ANXIETY QUESTIONNAIRES: GAD7 TOTAL SCORE: 2
